# Patient Record
Sex: FEMALE | Race: BLACK OR AFRICAN AMERICAN | URBAN - METROPOLITAN AREA
[De-identification: names, ages, dates, MRNs, and addresses within clinical notes are randomized per-mention and may not be internally consistent; named-entity substitution may affect disease eponyms.]

---

## 2017-04-07 ENCOUNTER — OFFICE VISIT (OUTPATIENT)
Dept: FAMILY MEDICINE CLINIC | Age: 27
End: 2017-04-07

## 2017-04-07 VITALS
WEIGHT: 141.4 LBS | HEART RATE: 92 BPM | TEMPERATURE: 98.1 F | HEIGHT: 68 IN | BODY MASS INDEX: 21.43 KG/M2 | RESPIRATION RATE: 16 BRPM | DIASTOLIC BLOOD PRESSURE: 89 MMHG | OXYGEN SATURATION: 98 % | SYSTOLIC BLOOD PRESSURE: 134 MMHG

## 2017-04-07 DIAGNOSIS — Z56.6 STRESS AT WORK: Primary | ICD-10-CM

## 2017-04-07 DIAGNOSIS — F41.8 DEPRESSION WITH ANXIETY: ICD-10-CM

## 2017-04-07 RX ORDER — ESCITALOPRAM OXALATE 10 MG/1
5 TABLET ORAL DAILY
Qty: 30 TAB | Refills: 3 | Status: SHIPPED | OUTPATIENT
Start: 2017-04-07 | End: 2017-05-21

## 2017-04-07 SDOH — HEALTH STABILITY - MENTAL HEALTH: OTHER PHYSICAL AND MENTAL STRAIN RELATED TO WORK: Z56.6

## 2017-04-07 NOTE — MR AVS SNAPSHOT
Visit Information Date & Time Provider Department Dept. Phone Encounter #  
 4/7/2017  8:00 AM Amado Kincaid MD [facility] [phone] [id number] Follow-up Instructions Return in about 6 weeks (around 5/19/2017). Upcoming Health Maintenance Date Due  
 PAP AKA CERVICAL CYTOLOGY 8/10/2018 DTaP/Tdap/Td series (2 - Td) 4/7/2027 Allergies as of 4/7/2017  Review Complete On: 4/7/2017 By: Amado Kincaid MD  
  
 Severity Noted Reaction Type Reactions Seafood  04/07/2017    Diarrhea Vomiting, chest discomfort, triggers asthma Current Immunizations  Reviewed on 4/7/2017 Name Date  
 TB Skin Test (PPD) Intradermal 3/29/2013 Reviewed by Amado Kincaid MD on 4/7/2017 at  8:36 AM  
 Reviewed by Amado Kincaid MD on 4/7/2017 at  8:39 AM  
You Were Diagnosed With   
  
 Codes Comments Stress at work    -  Primary ICD-10-CM: Z56.6 ICD-9-CM: V62.1 Depression with anxiety     ICD-10-CM: F41.8 ICD-9-CM: 300.4 Vitals BP Pulse Temp Resp Height(growth percentile) Weight(growth percentile) 134/89 (BP 1 Location: Left arm, BP Patient Position: Sitting) 92 98.1 °F (36.7 °C) (Oral) 16 5' 8\" (1.727 m) 141 lb 6.4 oz (64.1 kg) LMP SpO2 BMI OB Status Smoking Status 03/20/2017 98% 21.5 kg/m2 Having regular periods Never Smoker Vitals History BMI and BSA Data Body Mass Index Body Surface Area  
 21.5 kg/m 2 1.75 m 2 Preferred Pharmacy Pharmacy Name Phone [facility] [phone] [facility] [phone] Your Updated Medication List  
  
   
This list is accurate as of: 4/7/17  8:52 AM.  Always use your most recent med list.  
  
  
  
  
 albuterol 90 mcg/actuation inhaler Commonly known as:  PROVENTIL HFA, VENTOLIN HFA, PROAIR HFA Take 1 Puff by inhalation every four (4) hours as needed for Wheezing. escitalopram oxalate 10 mg tablet Commonly known as:  Aleatha Bernheim Take 0.5 Tabs by mouth daily. Dimple Calvin 150-35 mcg/24 hr  
Generic drug:  norelgestromin-ethinyl estradiol 1 Patch by TransDERmal route every Sunday. Prescriptions Sent to Pharmacy Refills  
 escitalopram oxalate (LEXAPRO) 10 mg tablet 3 Sig: Take 0.5 Tabs by mouth daily. Class: Normal  
 Pharmacy: [id number] [facility] [place] [address] [address] [place] [address] #: [phone] Route: Oral  
  
We Performed the Following REFERRAL TO PSYCHOLOGY [DEG91 Custom] Comments:  
 [place] Follow-up Instructions Return in about 6 weeks (around 5/19/2017). Referral Information Referral ID Referred By Referred To  
  
 [id number] Julienne KUMAR Not Available Visits Status Start Date End Date 1 New Request 4/7/17 4/7/18 If your referral has a status of pending review or denied, additional information will be sent to support the outcome of this decision. Introducing [facility]! Jade Aldrich introduces [employer] patient portal. Now you can access parts of your medical record, email your doctor's office, and request medication refills online. 1. In your internet browser, go to [email or web address] 2. Click on the First Time User? Click Here link in the Sign In box. You will see the New Member Sign Up page. 3. Enter your [employer] Access Code exactly as it appears below. You will not need to use this code after youve completed the sign-up process. If you do not sign up before the expiration date, you must request a new code. · [employer] Access Code: [email or web address] Expires: 7/6/2017  8:22 AM 
 
4. Enter the last four digits of your Social Security Number (xxxx) and Date of Birth (mm/dd/yyyy) as indicated and click Submit. You will be taken to the next sign-up page. 5. Create a [employer] ID.  This will be your [employer] login ID and cannot be changed, so think of one that is secure and easy to remember. 6. Create a ZEFR password. You can change your password at any time. 7. Enter your Password Reset Question and Answer. This can be used at a later time if you forget your password. 8. Enter your e-mail address. You will receive e-mail notification when new information is available in [address] 9. Click Sign Up. You can now view and download portions of your medical record. 10. Click the Download Summary menu link to download a portable copy of your medical information. If you have questions, please visit the Frequently Asked Questions section of the [employer] website. Remember, [employer] is NOT to be used for urgent needs. For medical emergencies, dial 911. Now available from your iPhone and Android! Please provide this summary of care documentation to your next provider. Your primary care clinician is listed as Viridiana Duggan. If you have any questions after today's visit, please call [phone].

## 2017-04-07 NOTE — LETTER
NOTIFICATION RETURN TO WORK / SCHOOL 
 
4/7/2017 8:51 AM 
 
Ms. Denise Davidson [address] [facility] [address] To Whom It May Concern: 
 
Denise Davidson is currently under the care of [facility]. She will return to work/school on: May 1, 2017 If there are questions or concerns please have the patient contact our office. Sincerely, Tish Phillips MD

## 2017-04-07 NOTE — PROGRESS NOTES
HISTORY OF PRESENT ILLNESS  Dangelo Snow is a 32 y.o. female. HPI   Pt states she has stress related to work and has been having a \" nervous stomach\" and feeling nauseous. Feels jittery and has no appetite. Pt reports this has been going on for about 1 month. Increase work load which has stressed her out. Increase paper work going to court and unrealistic expectations from her job. Looking for a new job. Feels depressed and has a range of emotions. Has been crying spell d/t not wanting to be there. Has not been sleeping well. Has worked at this job for 3 years. Patient Active Problem List   Diagnosis Code    Asthma J45.909       Current Outpatient Prescriptions   Medication Sig Dispense Refill    norelgestromin-ethinyl estradiol Dorise Orange) 150-35 mcg/24 hr 1 Patch by TransDERmal route every Sunday.  albuterol (PROVENTIL HFA, VENTOLIN HFA, PROAIR HFA) 90 mcg/actuation inhaler Take 1 Puff by inhalation every four (4) hours as needed for Wheezing. 1 Inhaler 11       Allergies   Allergen Reactions    Seafood Diarrhea     Vomiting, chest discomfort, triggers asthma       Past Medical History:   Diagnosis Date    Asthma        Past Surgical History:   Procedure Laterality Date    HX WISDOM TEETH EXTRACTION      INCISION OF TONGUE FOLD      age 11       Family History   Problem Relation Age of Onset    Diabetes Maternal Aunt     Diabetes Maternal Uncle     Cancer Paternal Grandmother      lung    No Known Problems Mother     No Known Problems Father        Social History   Substance Use Topics    Smoking status: Never Smoker    Smokeless tobacco: Never Used    Alcohol use 0.0 oz/week     0 Standard drinks or equivalent per week      Comment: socially        Review of Systems   Constitutional: Negative for malaise/fatigue. HENT: Negative for congestion. Eyes: Negative for blurred vision. Respiratory: Negative for cough and shortness of breath.     Cardiovascular: Negative for chest pain, palpitations and leg swelling. Gastrointestinal: Negative for abdominal pain, constipation and heartburn. Genitourinary: Negative for dysuria, frequency and urgency. Musculoskeletal: Negative for back pain and joint pain. Neurological: Negative for dizziness, tingling and headaches. Endo/Heme/Allergies: Negative for environmental allergies. Psychiatric/Behavioral: Positive for depression. The patient is nervous/anxious and has insomnia. Physical Exam   Constitutional: She appears well-developed and well-nourished. /89 (BP 1 Location: Left arm, BP Patient Position: Sitting)  Pulse 92  Temp 98.1 °F (36.7 °C) (Oral)   Resp 16  Ht 5' 8\" (1.727 m)  Wt 141 lb 6.4 oz (64.1 kg)  LMP 03/20/2017  SpO2 98%  BMI 21.5 kg/m2     HENT:   Right Ear: Tympanic membrane and ear canal normal.   Left Ear: Tympanic membrane and ear canal normal.   Nose: No mucosal edema or rhinorrhea. Mouth/Throat: Oropharynx is clear and moist and mucous membranes are normal.   Neck: Normal range of motion. Neck supple. No thyromegaly present. Cardiovascular: Normal rate and regular rhythm. No murmur heard. Pulmonary/Chest: Effort normal and breath sounds normal.   Abdominal: Soft. Bowel sounds are normal. There is no tenderness. Musculoskeletal: Normal range of motion. She exhibits no edema. Lymphadenopathy:     She has no cervical adenopathy. Skin: Skin is warm and dry. Psychiatric: Her speech is normal and behavior is normal. Thought content normal. Her mood appears anxious. She exhibits a depressed mood. Nursing note and vitals reviewed. ASSESSMENT and PLAN  Tyesha Arrooy was seen today for stress. Diagnoses and all orders for this visit:    Stress at work  -     REFERRAL TO PSYCHOLOGY    Depression with anxiety  -     REFERRAL TO PSYCHOLOGY    Other orders  -     escitalopram oxalate (LEXAPRO) 10 mg tablet; Take 0.5 Tabs by mouth daily.       Follow-up Disposition:  Return in about 6 weeks (around 5/19/2017). reviewed medications and side effects in detail    I have discussed diagnosis listed in this note with pt and/or family. I have discussed treatment plans and options and the risk/benefit analysis of those options, including safe use of medications and possible medication side effects. Through the use of shared decision making we have agreed to the above plan. The patient has received an after-visit summary and questions were answered concerning future plans and follow up. Advise pt of any urgent changes then to proceed to the ER.

## 2017-04-07 NOTE — PROGRESS NOTES
Chief Complaint   Patient presents with    Stress     work related     Pt states she has stress related to work and has been having a \" nervous stomach,\" feels jittery, and has no appetite. Pt reports this has been going on for about 1 month.

## 2017-05-19 ENCOUNTER — OFFICE VISIT (OUTPATIENT)
Dept: FAMILY MEDICINE CLINIC | Age: 27
End: 2017-05-19

## 2017-05-19 VITALS
OXYGEN SATURATION: 98 % | HEIGHT: 68 IN | TEMPERATURE: 98.5 F | RESPIRATION RATE: 16 BRPM | DIASTOLIC BLOOD PRESSURE: 69 MMHG | SYSTOLIC BLOOD PRESSURE: 113 MMHG | WEIGHT: 137.8 LBS | HEART RATE: 80 BPM | BODY MASS INDEX: 20.88 KG/M2

## 2017-05-19 DIAGNOSIS — Z91.09 ENVIRONMENTAL ALLERGIES: ICD-10-CM

## 2017-05-19 DIAGNOSIS — Z56.6 STRESS AT WORK: Primary | ICD-10-CM

## 2017-05-19 DIAGNOSIS — J45.20 MILD INTERMITTENT ASTHMA WITHOUT COMPLICATION: ICD-10-CM

## 2017-05-19 SDOH — HEALTH STABILITY - MENTAL HEALTH: OTHER PHYSICAL AND MENTAL STRAIN RELATED TO WORK: Z56.6

## 2017-05-19 NOTE — MR AVS SNAPSHOT
09/12/17        Magda January [address]      Dear Guiliana Link,    [address] records indicate that you have outstanding lab work and or testing that was ordered for you and has not yet been completed:      CMP       Lipids      [place] Visit Information Date & Time Provider Department Dept. Phone Encounter #  
 5/19/2017 11:45 AM Deja Cristina MD [facility] [phone] [id number] Follow-up Instructions Return in about 3 months (around 8/29/2017) for physical.  
  
Upcoming Health Maintenance Date Due INFLUENZA AGE 9 TO ADULT 8/1/2017 PAP AKA CERVICAL CYTOLOGY 8/10/2018 DTaP/Tdap/Td series (2 - Td) 4/7/2027 Allergies as of 5/19/2017  Review Complete On: 5/19/2017 By: Tony Cardenas Severity Noted Reaction Type Reactions Seafood  04/07/2017    Diarrhea Vomiting, chest discomfort, triggers asthma Current Immunizations  Reviewed on 5/19/2017 Name Date  
 TB Skin Test (PPD) Intradermal 3/29/2013 Reviewed by Deja Cristina MD on 5/19/2017 at 12:57 PM  
Vitals BP Pulse Temp Resp Height(growth percentile) Weight(growth percentile) 113/69 (BP 1 Location: Right arm, BP Patient Position: Sitting) 80 98.5 °F (36.9 °C) (Oral) 16 5' 8\" (1.727 m) 137 lb 12.8 oz (62.5 kg) LMP SpO2 BMI OB Status Smoking Status 04/28/2017 98% 20.95 kg/m2 Having regular periods Never Smoker Vitals History BMI and BSA Data Body Mass Index Body Surface Area  
 20.95 kg/m 2 1.73 m 2 Preferred Pharmacy Pharmacy Name Phone [facility] [phone] [facility] [phone] Your Updated Medication List  
  
   
This list is accurate as of: 5/19/17 12:58 PM.  Always use your most recent med list.  
  
  
  
  
 albuterol 90 mcg/actuation inhaler Commonly known as:  PROVENTIL HFA, VENTOLIN HFA, PROAIR HFA Take 1 Puff by inhalation every four (4) hours as needed for Wheezing.  
  
 escitalopram oxalate 10 mg tablet Commonly known as:  Oletha Dubs Take 0.5 Tabs by mouth daily. Obi Monterey 150-35 mcg/24 hr  
Generic drug:  norelgestromin-ethinyl estradiol 1 Patch by TransDERmal route every Sunday. Follow-up Instructions Return in about 3 months (around 8/29/2017) for physical.  
  
  
Introducing [facility]! Francisco Grider introduces [employer] patient portal. Now you can access parts of your medical record, email your doctor's office, and request medication refills online. 1. In your internet browser, go to [email or web address] 2. Click on the First Time User? Click Here link in the Sign In box. You will see the New Member Sign Up page. 3. Enter your [employer] Access Code exactly as it appears below. You will not need to use this code after youve completed the sign-up process. If you do not sign up before the expiration date, you must request a new code. · [employer] Access Code: [email or web address] Expires: 7/6/2017  8:22 AM 
 
4. Enter the last four digits of your Social Security Number (xxxx) and Date of Birth (mm/dd/yyyy) as indicated and click Submit. You will be taken to the next sign-up page. 5. Create a [employer] ID. This will be your [employer] login ID and cannot be changed, so think of one that is secure and easy to remember. 6. Create a [employer] password. You can change your password at any time. 7. Enter your Password Reset Question and Answer. This can be used at a later time if you forget your password. 8. Enter your e-mail address. You will receive e-mail notification when new information is available in [address] 9. Click Sign Up. You can now view and download portions of your medical record. 10. Click the Download Summary menu link to download a portable copy of your medical information. If you have questions, please visit the Frequently Asked Questions section of the [employer] website. Remember, [employer] is NOT to be used for urgent needs. For medical emergencies, dial 911. Now available from your iPhone and Android! Please provide this summary of care documentation to your next provider. Your primary care clinician is listed as Catalino Orr. If you have any questions after today's visit, please call [phone].

## 2017-05-21 RX ORDER — ALBUTEROL SULFATE 90 UG/1
1 AEROSOL, METERED RESPIRATORY (INHALATION)
Qty: 1 INHALER | Refills: 11 | Status: SHIPPED | OUTPATIENT
Start: 2017-05-21 | End: 2018-05-14 | Stop reason: SDUPTHER

## 2017-05-21 NOTE — PROGRESS NOTES
HISTORY OF PRESENT ILLNESS  Re Abrams is a 32 y.o. female. HPI   Pt states she has stress related to work and has been having a \" nervous stomach\" and feeling nauseous. Feels jittery and has no appetite. Pt reports this has been going on for about 1 month. Increase work load which has stressed her out. Increase paper work going to court and unrealistic expectations from her job. Looking for a new job. Feels depressed and has a range of emotions. Has been crying spell d/t not wanting to be there. Has not been sleeping well. Has worked at this job for 3 years. Patient Active Problem List   Diagnosis Code    Asthma J45.909       Current Outpatient Prescriptions   Medication Sig Dispense Refill    albuterol (PROVENTIL HFA, VENTOLIN HFA, PROAIR HFA) 90 mcg/actuation inhaler Take 1 Puff by inhalation every four (4) hours as needed for Wheezing. 1 Inhaler 11    norelgestromin-ethinyl estradiol (XULANE) 150-35 mcg/24 hr 1 Patch by TransDERmal route every Sunday. Allergies   Allergen Reactions    Seafood Diarrhea     Vomiting, chest discomfort, triggers asthma         Past Medical History:   Diagnosis Date    Asthma          Past Surgical History:   Procedure Laterality Date    HX WISDOM TEETH EXTRACTION      INCISION OF TONGUE FOLD      age 11         Family History   Problem Relation Age of Onset    Diabetes Maternal Aunt     Diabetes Maternal Uncle     Cancer Paternal Grandmother      lung    No Known Problems Mother     No Known Problems Father        Social History   Substance Use Topics    Smoking status: Never Smoker    Smokeless tobacco: Never Used    Alcohol use 0.0 oz/week     0 Standard drinks or equivalent per week      Comment: socially        Review of Systems   Constitutional: Negative for malaise/fatigue. HENT: Negative for congestion. Eyes: Negative for blurred vision. Respiratory: Negative for cough and shortness of breath.     Cardiovascular: Negative for chest pain, palpitations and leg swelling. Gastrointestinal: Negative for abdominal pain, constipation and heartburn. Genitourinary: Negative for dysuria, frequency and urgency. Musculoskeletal: Negative for back pain and joint pain. Neurological: Negative for dizziness, tingling and headaches. Endo/Heme/Allergies: Negative for environmental allergies. Psychiatric/Behavioral: Positive for depression. The patient is nervous/anxious and has insomnia. Physical Exam   Constitutional: She appears well-developed and well-nourished. /89 (BP 1 Location: Left arm, BP Patient Position: Sitting)  Pulse 92  Temp 98.1 °F (36.7 °C) (Oral)   Resp 16  Ht 5' 8\" (1.727 m)  Wt 141 lb 6.4 oz (64.1 kg)  LMP 03/20/2017  SpO2 98%  BMI 21.5 kg/m2     HENT:   Right Ear: Tympanic membrane and ear canal normal.   Left Ear: Tympanic membrane and ear canal normal.   Nose: No mucosal edema or rhinorrhea. Mouth/Throat: Oropharynx is clear and moist and mucous membranes are normal.   Neck: Normal range of motion. Neck supple. No thyromegaly present. Cardiovascular: Normal rate and regular rhythm. No murmur heard. Pulmonary/Chest: Effort normal and breath sounds normal.   Abdominal: Soft. Bowel sounds are normal. There is no tenderness. Musculoskeletal: Normal range of motion. She exhibits no edema. Lymphadenopathy:     She has no cervical adenopathy. Skin: Skin is warm and dry. Psychiatric: Her speech is normal and behavior is normal. Thought content normal. Her mood appears anxious. She exhibits a depressed mood. Nursing note and vitals reviewed. ASSESSMENT and PLAN  There are no diagnoses linked to this encounter. Follow-up Disposition:  Return in about 3 months (around 8/29/2017) for physical.  reviewed medications and side effects in detail    I have discussed diagnosis listed in this note with pt and/or family.  I have discussed treatment plans and options and the risk/benefit analysis of those options, including safe use of medications and possible medication side effects. Through the use of shared decision making we have agreed to the above plan. The patient has received an after-visit summary and questions were answered concerning future plans and follow up. Advise pt of any urgent changes then to proceed to the ER.

## 2017-05-21 NOTE — PROGRESS NOTES
HISTORY OF PRESENT ILLNESS  Brigido Burrows is a 32 y.o. female. HPI     Follow up on stress at work. Has a new job starting on June 4th and stress from old job has resolved. Sleeping back to her normal.  Feeling happy. Completed counseling. Stopped lexapro as she feels it was no longer needed. Asthma Review:  The patient is being seen for follow up of asthma and allergies, not currently in exacerbation. Asthma symptoms occur: less than 2x month and with respiratory infections. Wheezing when present is described as mild and easily relieved with rescue bronchodilator. Current limitations in activity from asthma: none. Frequency of use of quick-relief meds: prn. Regimen compliance: The patient reports adherence to asthma action plan and regimen. Patient Active Problem List   Diagnosis Code    Asthma J45.909       Current Outpatient Prescriptions   Medication Sig Dispense Refill    albuterol (PROVENTIL HFA, VENTOLIN HFA, PROAIR HFA) 90 mcg/actuation inhaler Take 1 Puff by inhalation every four (4) hours as needed for Wheezing. 1 Inhaler 11    norelgestromin-ethinyl estradiol (XULANE) 150-35 mcg/24 hr 1 Patch by TransDERmal route every Sunday.          Allergies   Allergen Reactions    Seafood Diarrhea     Vomiting, chest discomfort, triggers asthma       Past Medical History:   Diagnosis Date    Asthma        Past Surgical History:   Procedure Laterality Date    HX WISDOM TEETH EXTRACTION      INCISION OF TONGUE FOLD      age 11       Family History   Problem Relation Age of Onset    Diabetes Maternal Aunt     Diabetes Maternal Uncle     Cancer Paternal Grandmother      lung    No Known Problems Mother     No Known Problems Father        Social History   Substance Use Topics    Smoking status: Never Smoker    Smokeless tobacco: Never Used    Alcohol use 0.0 oz/week     0 Standard drinks or equivalent per week      Comment: socially       Review of Systems   Constitutional: Negative for malaise/fatigue. HENT: Negative for congestion. Eyes: Negative for blurred vision. Respiratory: Negative for cough and shortness of breath. Cardiovascular: Negative for chest pain, palpitations and leg swelling. Gastrointestinal: Negative for abdominal pain, constipation and heartburn. Genitourinary: Negative for dysuria, frequency and urgency. Musculoskeletal: Negative for back pain and joint pain. Neurological: Negative for dizziness, tingling and headaches. Endo/Heme/Allergies: Positive for environmental allergies. Psychiatric/Behavioral: Negative for depression. The patient does not have insomnia. Physical Exam   Constitutional: She appears well-developed and well-nourished. /69 (BP 1 Location: Right arm, BP Patient Position: Sitting)  Pulse 80  Temp 98.5 °F (36.9 °C) (Oral)   Resp 16  Ht 5' 8\" (1.727 m)  Wt 137 lb 12.8 oz (62.5 kg)  LMP 04/28/2017  SpO2 98%  BMI 20.95 kg/m2     HENT:   Right Ear: Tympanic membrane and ear canal normal.   Left Ear: Tympanic membrane and ear canal normal.   Nose: No mucosal edema or rhinorrhea. Mouth/Throat: Oropharynx is clear and moist and mucous membranes are normal.   Neck: Normal range of motion. Neck supple. No thyromegaly present. Cardiovascular: Normal rate and regular rhythm. No murmur heard. Pulmonary/Chest: Effort normal and breath sounds normal.   Abdominal: Soft. Bowel sounds are normal. There is no tenderness. Musculoskeletal: Normal range of motion. She exhibits no edema. Lymphadenopathy:     She has no cervical adenopathy. Skin: Skin is warm and dry. Psychiatric: She has a normal mood and affect. Nursing note and vitals reviewed. ASSESSMENT and PLAN  Winnie Andrews was seen today for stress.     Diagnoses and all orders for this visit:    Stress at work  Improved     Environmental allergies//  Mild intermittent asthma without complication  -     Refill albuterol (PROVENTIL HFA, VENTOLIN HFA, PROAIR HFA) 90 mcg/actuation inhaler; Take 1 Puff by inhalation every four (4) hours as needed for Wheezing.       Follow-up Disposition:  Return in about 3 months (around 8/29/2017) for physical.

## 2018-05-14 ENCOUNTER — OFFICE VISIT (OUTPATIENT)
Dept: FAMILY MEDICINE CLINIC | Age: 28
End: 2018-05-14

## 2018-05-14 VITALS
HEIGHT: 68 IN | BODY MASS INDEX: 21.22 KG/M2 | OXYGEN SATURATION: 100 % | HEART RATE: 90 BPM | DIASTOLIC BLOOD PRESSURE: 88 MMHG | RESPIRATION RATE: 18 BRPM | WEIGHT: 140 LBS | SYSTOLIC BLOOD PRESSURE: 133 MMHG | TEMPERATURE: 98.7 F

## 2018-05-14 DIAGNOSIS — R11.0 NAUSEA: Primary | ICD-10-CM

## 2018-05-14 DIAGNOSIS — J45.20 MILD INTERMITTENT ASTHMA WITHOUT COMPLICATION: ICD-10-CM

## 2018-05-14 LAB
BILIRUB UR QL STRIP: NEGATIVE
GLUCOSE UR-MCNC: NEGATIVE MG/DL
HCG URINE, QL. (POC): NEGATIVE
KETONES P FAST UR STRIP-MCNC: NORMAL MG/DL
PH UR STRIP: 6 [PH] (ref 4.6–8)
PROT UR QL STRIP: NEGATIVE
SP GR UR STRIP: 1.02 (ref 1–1.03)
UA UROBILINOGEN AMB POC: NORMAL (ref 0.2–1)
URINALYSIS CLARITY POC: CLEAR
URINALYSIS COLOR POC: YELLOW
URINE BLOOD POC: NEGATIVE
URINE LEUKOCYTES POC: NEGATIVE
URINE NITRITES POC: NEGATIVE
VALID INTERNAL CONTROL?: YES

## 2018-05-14 RX ORDER — ALBUTEROL SULFATE 90 UG/1
1 AEROSOL, METERED RESPIRATORY (INHALATION)
Qty: 1 INHALER | Refills: 11 | Status: SHIPPED | OUTPATIENT
Start: 2018-05-14 | End: 2018-08-20 | Stop reason: SDUPTHER

## 2018-05-14 NOTE — PROGRESS NOTES
HISTORY OF PRESENT ILLNESS  Lazaro Ritter is a 29 y.o. female. Nausea    This is a new problem. The current episode started more than 2 days ago. The problem occurs 2 to 4 times per day. The problem has been resolved. There has been no fever. Pertinent negatives include no chills, no fever, no abdominal pain, no diarrhea, no headaches, no myalgias, no cough and no headaches. Nausea started on last week and lasted for about 4 days. Over the weekend her sx have resolved. No particular food triggers noted. LMP 4/13/18 however she did not remove the patch for this week so that she would not have a period yet. Patient Active Problem List   Diagnosis Code    Asthma J45.909       Current Outpatient Prescriptions   Medication Sig Dispense Refill    albuterol (PROVENTIL HFA, VENTOLIN HFA, PROAIR HFA) 90 mcg/actuation inhaler Take 1 Puff by inhalation every four (4) hours as needed for Wheezing. 1 Inhaler 11    norelgestromin-ethinyl estradiol (XULANE) 150-35 mcg/24 hr 1 Patch by TransDERmal route every Sunday. Allergies   Allergen Reactions    Seafood Diarrhea     Vomiting, chest discomfort, triggers asthma       Past Medical History:   Diagnosis Date    Asthma        Past Surgical History:   Procedure Laterality Date    HX WISDOM TEETH EXTRACTION      [place] INCISION OF TONGUE FOLD      age 11       Family History   Problem Relation Age of Onset    Diabetes Maternal Aunt     Diabetes Maternal Uncle     Cancer Paternal Grandmother      lung    No Known Problems Mother     No Known Problems Father        Social History   Substance Use Topics    Smoking status: Never Smoker    Smokeless tobacco: Never Used    Alcohol use 0.0 oz/week     0 Standard drinks or equivalent per week      Comment: socially        Review of Systems   Constitutional: Negative for chills, fever and malaise/fatigue. HENT: Negative for congestion. Eyes: Negative for blurred vision.    Respiratory: Negative for cough and shortness of breath. Cardiovascular: Negative for chest pain, palpitations and leg swelling. Gastrointestinal: Positive for nausea. Negative for abdominal pain, constipation, diarrhea and heartburn. Genitourinary: Negative for dysuria, frequency and urgency. Musculoskeletal: Negative for back pain, joint pain and myalgias. Neurological: Negative for dizziness, tingling and headaches. Endo/Heme/Allergies: Negative for environmental allergies. Psychiatric/Behavioral: Negative for depression. The patient does not have insomnia. Physical Exam   Constitutional: She appears well-developed and well-nourished. /88 (BP 1 Location: Left arm, BP Patient Position: Sitting)  Pulse 90  Temp 98.7 °F (37.1 °C) (Oral)   Resp 18  Ht 5' 8\" (1.727 m)  Wt 140 lb (63.5 kg)  LMP 04/15/2018  SpO2 100%  BMI 21.29 kg/m2   HENT:   Right Ear: Tympanic membrane and ear canal normal.   Left Ear: Tympanic membrane and ear canal normal.   Nose: No mucosal edema or rhinorrhea. Mouth/Throat: Oropharynx is clear and moist and mucous membranes are normal.   Neck: Normal range of motion. Neck supple. No thyromegaly present. Cardiovascular: Normal rate and regular rhythm. No murmur heard. Pulmonary/Chest: Effort normal and breath sounds normal.   Abdominal: Soft. Bowel sounds are normal. There is no tenderness. Musculoskeletal: Normal range of motion. She exhibits no edema. Lymphadenopathy:     She has no cervical adenopathy. Skin: Skin is warm and dry. Psychiatric: She has a normal mood and affect. Nursing note and vitals reviewed. ASSESSMENT and PLAN  Diagnoses and all orders for this visit:    1. Nausea  -     AMB POC URINE PREGNANCY TEST, VISUAL COLOR COMPARISON  -  Negative. -     AMB POC URINALYSIS DIP STICK AUTO W/ MICRO  Light diet over the next few days.       2. Mild intermittent asthma without complication  -     Refill albuterol (PROVENTIL HFA, VENTOLIN HFA, PROAIR HFA) 90 mcg/actuation inhaler; Take 1 Puff by inhalation every four (4) hours as needed for Wheezing.       Follow-up Disposition: follow up as needed

## 2018-05-14 NOTE — MR AVS SNAPSHOT
[facility] 
 
 
 [address] [facility] [place] [address] [address] 
[phone] Patient: Aisha Kellogg MRN: [id number] FWA:[birth year] Visit Information Date & Time Provider Department Dept. Phone Encounter #  
 5/14/2018  3:15 PM Tamika Sampson MD [facility] [phone] [id number] Your Appointments 8/20/2018  2:15 PM  
Complete Physical with Tamika Sampson MD  
[facility] [address] [facility]) Appt Note: CPE 28YR $20CP, CC, $0PB EYT 05/12/2018  
 [address] [facility] [id number] [phone] [address] [place] [address] Upcoming Health Maintenance Date Due  
 PAP AKA CERVICAL CYTOLOGY 8/10/2018 Influenza Age 5 to Adult 8/1/2018 DTaP/Tdap/Td series (2 - Td) 4/7/2027 Allergies as of 5/14/2018  Review Complete On: 5/14/2018 By: Ginette Scales LPN Severity Noted Reaction Type Reactions Seafood  04/07/2017    Diarrhea Vomiting, chest discomfort, triggers asthma Current Immunizations  Reviewed on 5/14/2018 Name Date  
 TB Skin Test (PPD) Intradermal 3/29/2013 Reviewed by Tamika Sampson MD on 5/14/2018 at  4:03 PM  
You Were Diagnosed With   
  
 Codes Comments Nausea    -  Primary ICD-10-CM: R11.0 ICD-9-CM: 787.02   
 Mild intermittent asthma without complication     YDY-72-GE: J45.20 ICD-9-CM: 493.90 Vitals BP Pulse Temp Resp Height(growth percentile) Weight(growth percentile) 133/88 (BP 1 Location: Left arm, BP Patient Position: Sitting) 90 98.7 °F (37.1 °C) (Oral) 18 5' 8\" (1.727 m) 140 lb (63.5 kg) LMP SpO2 BMI OB Status Smoking Status 04/15/2018 100% 21.29 kg/m2 Having regular periods Never Smoker BMI and BSA Data Body Mass Index Body Surface Area  
 21.29 kg/m 2 1.75 m 2 [facility]erred [facility] [address] [place] [address], [address] [place] [address] Rd. [phone] Your Updated Medication List  
  
   
This list is accurate as of 5/14/18  4:09 PM.  Always use your most recent med list.  
  
  
  
  
 albuterol 90 mcg/actuation inhaler Commonly known as:  PROVENTIL HFA, VENTOLIN HFA, PROAIR HFA Take 1 Puff by inhalation every four (4) hours as needed for Wheezing. Jerelene Lowe 150-35 mcg/24 hr  
Generic drug:  norelgestromin-ethinyl estradiol 1 Patch by TransDERmal route every Sunday. Prescriptions Sent to Pharmacy Refills  
 albuterol (PROVENTIL HFA, VENTOLIN HFA, PROAIR HFA) 90 mcg/actuation inhaler 11 Sig: Take 1 Puff by inhalation every four (4) hours as needed for Wheezing. Class: Normal  
 Pharmacy: [facility] [address] [facility] #: [phone] Route: Inhalation We Performed the Following AMB POC URINALYSIS DIP STICK AUTO W/ MICRO [07297 CPT(R)] AMB POC URINE PREGNANCY TEST, VISUAL COLOR COMPARISON [83816 CPT(R)] Patient Instructions Nausea and Vomiting: Care Instructions Your Care Instructions When you are nauseated, you may feel weak and sweaty and notice a lot of saliva in your mouth. Nausea often leads to vomiting. Most of the time you do not need to worry about nausea and vomiting, but they can be signs of other illnesses. Two common causes of nausea and vomiting are stomach flu and food poisoning. Nausea and vomiting from viral stomach flu will usually start to improve within 24 hours. Nausea and vomiting from food poisoning may last from 12 to 48 hours. The doctor has checked you carefully, but problems can develop later. If you notice any problems or new symptoms, get medical treatment right away. Follow-up care is a key part of your treatment and safety.  Be sure to make and go to all appointments, and call your doctor if you are having problems. It's also a good idea to know your test results and keep a list of the medicines you take. How can you care for yourself at home? · To prevent dehydration, drink plenty of fluids, enough so that your urine is light yellow or clear like water. Choose water and other caffeine-free clear liquids until you feel better. If you have kidney, heart, or liver disease and have to limit fluids, talk with your doctor before you increase the amount of fluids you drink. · Rest in bed until you feel better. · When you are able to eat, try clear soups, mild foods, and liquids until all symptoms are gone for 12 to 48 hours. Other good choices include dry toast, crackers, cooked cereal, and gelatin dessert, such as Jell-O. When should you call for help? Call 911 anytime you think you may need emergency care. For example, call if: 
? · You passed out (lost consciousness). ?Call your doctor now or seek immediate medical care if: 
? · You have symptoms of dehydration, such as: ¨ Dry eyes and a dry mouth. ¨ Passing only a little dark urine. ¨ Feeling thirstier than usual.  
? · You have new or worsening belly pain. ? · You have a new or higher fever. ? · You vomit blood or what looks like coffee grounds. ? Watch closely for changes in your health, and be sure to contact your doctor if: 
? · You have ongoing nausea and vomiting. ? · Your vomiting is getting worse. ? · Your vomiting lasts longer than 2 days. ? · You are not getting better as expected. Where can you learn more? Go to http://nida-pipo.info/. Enter 25 290348 in the search box to learn more about \"Nausea and Vomiting: Care Instructions. \" Current as of: March 20, 2017 Content Version: 11.4 © 0139-6284 [employer]. Care instructions adapted under license by [employer] (which disclaims liability or warranty for this information).  If you have questions about a medical condition or this instruction, always ask your healthcare professional. [place] [address] any warranty or liability for your use of this information. Introducing [facility]! Rashel Flores introduces [employer] patient portal. Now you can access parts of your medical record, email your doctor's office, and request medication refills online. 1. In your internet browser, go to [email or web address] 2. Click on the First Time User? Click Here link in the Sign In box. You will see the New Member Sign Up page. 3. Enter your [employer] Access Code exactly as it appears below. You will not need to use this code after youve completed the sign-up process. If you do not sign up before the expiration date, you must request a new code. · [employer] Access Code: [email or web address] Expires: 8/12/2018  3:34 PM 
 
4. Enter the last four digits of your Social Security Number (xxxx) and Date of Birth (mm/dd/yyyy) as indicated and click Submit. You will be taken to the next sign-up page. 5. Create a [employer] ID. This will be your [employer] login ID and cannot be changed, so think of one that is secure and easy to remember. 6. Create a [employer] password. You can change your password at any time. 7. Enter your Password Reset Question and Answer. This can be used at a later time if you forget your password. 8. Enter your e-mail address. You will receive e-mail notification when new information is available in [address] 9. Click Sign Up. You can now view and download portions of your medical record. 10. Click the Download Summary menu link to download a portable copy of your medical information. If you have questions, please visit the Frequently Asked Questions section of the [employer] website. Remember, [employer] is NOT to be used for urgent needs. For medical emergencies, dial 911. Now available from your iPhone and Android! Please provide this summary of care documentation to your next provider. Your primary care clinician is listed as Caridad Herman. If you have any questions after today's visit, please call [phone].

## 2018-05-14 NOTE — PATIENT INSTRUCTIONS
Nausea and Vomiting: Care Instructions  Your Care Instructions    When you are nauseated, you may feel weak and sweaty and notice a lot of saliva in your mouth. Nausea often leads to vomiting. Most of the time you do not need to worry about nausea and vomiting, but they can be signs of other illnesses. Two common causes of nausea and vomiting are stomach flu and food poisoning. Nausea and vomiting from viral stomach flu will usually start to improve within 24 hours. Nausea and vomiting from food poisoning may last from 12 to 48 hours. The doctor has checked you carefully, but problems can develop later. If you notice any problems or new symptoms, get medical treatment right away. Follow-up care is a key part of your treatment and safety. Be sure to make and go to all appointments, and call your doctor if you are having problems. It's also a good idea to know your test results and keep a list of the medicines you take. How can you care for yourself at home? · To prevent dehydration, drink plenty of fluids, enough so that your urine is light yellow or clear like water. Choose water and other caffeine-free clear liquids until you feel better. If you have kidney, heart, or liver disease and have to limit fluids, talk with your doctor before you increase the amount of fluids you drink. · Rest in bed until you feel better. · When you are able to eat, try clear soups, mild foods, and liquids until all symptoms are gone for 12 to 48 hours. Other good choices include dry toast, crackers, cooked cereal, and gelatin dessert, such as Jell-O. When should you call for help? Call 911 anytime you think you may need emergency care. For example, call if:  ? · You passed out (lost consciousness). ?Call your doctor now or seek immediate medical care if:  ? · You have symptoms of dehydration, such as:  ¨ Dry eyes and a dry mouth. ¨ Passing only a little dark urine.   ¨ Feeling thirstier than usual.   ? · You have new or worsening belly pain. ? · You have a new or higher fever. ? · You vomit blood or what looks like coffee grounds. ? Watch closely for changes in your health, and be sure to contact your doctor if:  ? · You have ongoing nausea and vomiting. ? · Your vomiting is getting worse. ? · Your vomiting lasts longer than 2 days. ? · You are not getting better as expected. Where can you learn more? Go to http://nida-pipo.info/. Enter 25 [id number] in the search box to learn more about \"Nausea and Vomiting: Care Instructions. \"  Current as of: March 20, 2017  Content Version: 11.4  © 0710-8801 [employer]. Care instructions adapted under license by [employer] (which disclaims liability or warranty for this information). If you have questions about a medical condition or this instruction, always ask your healthcare professional. [address] any warranty or liability for your use of this information.

## 2018-08-20 ENCOUNTER — OFFICE VISIT (OUTPATIENT)
Dept: FAMILY MEDICINE CLINIC | Age: 28
End: 2018-08-20

## 2018-08-20 VITALS
HEIGHT: 68 IN | SYSTOLIC BLOOD PRESSURE: 112 MMHG | WEIGHT: 138.5 LBS | TEMPERATURE: 97.4 F | BODY MASS INDEX: 20.99 KG/M2 | HEART RATE: 86 BPM | RESPIRATION RATE: 16 BRPM | OXYGEN SATURATION: 96 % | DIASTOLIC BLOOD PRESSURE: 71 MMHG

## 2018-08-20 DIAGNOSIS — Z00.00 ROUTINE GENERAL MEDICAL EXAMINATION AT A HEALTH CARE FACILITY: Primary | ICD-10-CM

## 2018-08-20 DIAGNOSIS — J45.20 MILD INTERMITTENT ASTHMA WITHOUT COMPLICATION: ICD-10-CM

## 2018-08-20 LAB
BILIRUB UR QL STRIP: NEGATIVE
GLUCOSE UR-MCNC: NEGATIVE MG/DL
KETONES P FAST UR STRIP-MCNC: NEGATIVE MG/DL
PH UR STRIP: 6.5 [PH] (ref 4.6–8)
PROT UR QL STRIP: NEGATIVE
SP GR UR STRIP: 1.02 (ref 1–1.03)
UA UROBILINOGEN AMB POC: NORMAL (ref 0.2–1)
URINALYSIS CLARITY POC: CLEAR
URINALYSIS COLOR POC: YELLOW
URINE BLOOD POC: NORMAL
URINE LEUKOCYTES POC: NEGATIVE
URINE NITRITES POC: NEGATIVE

## 2018-08-20 RX ORDER — ALBUTEROL SULFATE 90 UG/1
1 AEROSOL, METERED RESPIRATORY (INHALATION)
Qty: 1 INHALER | Refills: 11 | Status: SHIPPED | OUTPATIENT
Start: 2018-08-20

## 2018-08-20 NOTE — PATIENT INSTRUCTIONS

## 2018-08-20 NOTE — MR AVS SNAPSHOT
[facility] 
 
 
 [address] [id number] [phone] Patient: Silver Ugarte MRN: [id number] Visit Information Date & Time Provider Department Dept. Phone Encounter #  
 8/20/2018  2:15 PM Abbey Moss [place] [phone] [id number] Upcoming Health Maintenance Date Due Influenza Age 5 to Adult 8/20/2019* PAP AKA CERVICAL CYTOLOGY 2/28/2021 DTaP/Tdap/Td series (2 - Td) 4/7/2027 *Topic was postponed. The date shown is not the original due date. Allergies as of 8/20/2018  Review Complete On: 8/20/2018 By: Abbey Moss MD  
  
 Severity Noted Reaction Type Reactions Seafood  04/07/2017    Diarrhea Vomiting, chest discomfort, triggers asthma Current Immunizations  Reviewed on 8/20/2018 Name Date  
 TB Skin Test (PPD) Intradermal 3/29/2013 Reviewed by Abbey Moss MD on 8/20/2018 at  2:51 PM  
You Were Diagnosed With   
  
 Codes Comments Routine general medical examination at a health care facility    -  Primary ICD-10-CM: Z00.00 ICD-9-CM: V70.0 Mild intermittent asthma without complication     PRR-93-BX: J45.20 ICD-9-CM: 493.90 Vitals BP Pulse Temp Resp Height(growth percentile) Weight(growth percentile) 112/71 (BP 1 Location: Right arm, BP Patient Position: Sitting) 86 97.4 °F (36.3 °C) (Oral) 16 5' 8\" (1.727 m) 138 lb 8 oz (62.8 kg) LMP SpO2 BMI OB Status Smoking Status 08/06/2018 (Approximate) 96% 21.06 kg/m2 Having regular periods Never Smoker BMI and BSA Data Body Mass Index Body Surface Area 21.06 kg/m 2 1.74 m 2 Preferred Pharmacy Pharmacy Name Phone [facility] [address], [address] [place] [address] Rd. [phone] Your Updated Medication List  
  
   
This list is accurate as of 8/20/18  3:13 PM.  Always use your most recent med list.  
  
  
  
 albuterol 90 mcg/actuation inhaler Commonly known as:  PROVENTIL HFA, VENTOLIN HFA, PROAIR HFA Take 1 Puff by inhalation every four (4) hours as needed for Wheezing. Simone Landa 150-35 mcg/24 hr  
Generic drug:  norelgestromin-ethinyl estradiol 1 Patch by TransDERmal route every Sunday. We Performed the Following AMB POC URINALYSIS DIP STICK AUTO W/ MICRO [48718 CPT(R)] CBC W/O DIFF [48270 CPT(R)] LIPID PANEL [38655 CPT(R)] METABOLIC PANEL, COMPREHENSIVE [82523 CPT(R)] Patient Instructions Well Visit, Ages 25 to 48: Care Instructions Your Care Instructions Physical exams can help you stay healthy. Your doctor has checked your overall health and may have suggested ways to take good care of yourself. He or she also may have recommended tests. At home, you can help prevent illness with healthy eating, regular exercise, and other steps. Follow-up care is a key part of your treatment and safety. Be sure to make and go to all appointments, and call your doctor if you are having problems. It's also a good idea to know your test results and keep a list of the medicines you take. How can you care for yourself at home? · Reach and stay at a healthy weight. This will lower your risk for many problems, such as obesity, diabetes, heart disease, and high blood pressure. · Get at least 30 minutes of physical activity on most days of the week. Walking is a good choice. You also may want to do other activities, such as running, swimming, cycling, or playing tennis or team sports. Discuss any changes in your exercise program with your doctor. · Do not smoke or allow others to smoke around you. If you need help quitting, talk to your doctor about stop-smoking programs and medicines. These can increase your chances of quitting for good.  
· Talk to your doctor about whether you have any risk factors for sexually transmitted infections (STIs). Having one sex partner (who does not have STIs and does not have sex with anyone else) is a good way to avoid these infections. · Use birth control if you do not want to have children at this time. Talk with your doctor about the choices available and what might be best for you. · Protect your skin from too much sun. When you're outdoors from 10 a.m. to 4 p.m., stay in the shade or cover up with clothing and a hat with a wide brim. Wear sunglasses that block UV rays. Even when it's cloudy, put broad-spectrum sunscreen (SPF 30 or higher) on any exposed skin. · See a dentist one or two times a year for checkups and to have your teeth cleaned. · Wear a seat belt in the car. · Drink alcohol in moderation, if at all. That means no more than 2 drinks a day for men and 1 drink a day for women. Follow your doctor's advice about when to have certain tests. These tests can spot problems early. For everyone · Cholesterol. Have the fat (cholesterol) in your blood tested after age 21. Your doctor will tell you how often to have this done based on your age, family history, or other things that can increase your risk for heart disease. · Blood pressure. Have your blood pressure checked during a routine doctor visit. Your doctor will tell you how often to check your blood pressure based on your age, your blood pressure results, and other factors. · Vision. Talk with your doctor about how often to have a glaucoma test. 
· Diabetes. Ask your doctor whether you should have tests for diabetes. · Colon cancer. Have a test for colon cancer at age 48. You may have one of several tests. If you are younger than 48, you may need a test earlier if you have any risk factors. Risk factors include whether you already had a precancerous polyp removed from your colon or whether your parent, brother, sister, or child has had colon cancer. For women · Breast exam and mammogram. Talk to your doctor about when you should have a clinical breast exam and a mammogram. Medical experts differ on whether and how often women under 50 should have these tests. Your doctor can help you decide what is right for you. · Pap test and pelvic exam. Begin Pap tests at age 24. A Pap test is the best way to find cervical cancer. The test often is part of a pelvic exam. Ask how often to have this test. 
· Tests for sexually transmitted infections (STIs). Ask whether you should have tests for STIs. You may be at risk if you have sex with more than one person, especially if your partners do not wear condoms. For men · Tests for sexually transmitted infections (STIs). Ask whether you should have tests for STIs. You may be at risk if you have sex with more than one person, especially if you do not wear a condom. · Testicular cancer exam. Ask your doctor whether you should check your testicles regularly. · Prostate exam. Talk to your doctor about whether you should have a blood test (called a PSA test) for prostate cancer. Experts differ on whether and when men should have this test. Some experts suggest it if you are older than 39 and are -American or have a father or brother who got prostate cancer when he was younger than 72. When should you call for help? Watch closely for changes in your health, and be sure to contact your doctor if you have any problems or symptoms that concern you. Where can you learn more? Go to http://nida-pipo.info/. Enter P072 in the search box to learn more about \"Well Visit, Ages 25 to 48: Care Instructions. \" Current as of: May 16, 2017 Content Version: 11.7 © 2958-1692 Healthwise, Incorporated. Care instructions adapted under license by [employer] (which disclaims liability or warranty for this information).  If you have questions about a medical condition or this instruction, always ask your healthcare professional. [address] any warranty or liability for your use of this information. Introducing hospitals & HEALTH SERVICES! [facility] introduces [employer] patient portal. Now you can access parts of your medical record, email your doctor's office, and request medication refills online. 1. In your internet browser, go to [email or web address] 2. Click on the First Time User? Click Here link in the Sign In box. You will see the New Member Sign Up page. 3. Enter your [employer] Access Code exactly as it appears below. You will not need to use this code after youve completed the sign-up process. If you do not sign up before the expiration date, you must request a new code. · [employer] Access Code: [email or web address] Expires: 11/18/2018  2:23 PM 
 
4. Enter the last four digits of your Social Security Number (xxxx) and Date of Birth (mm/dd/yyyy) as indicated and click Submit. You will be taken to the next sign-up page. 5. Create a [employer] ID. This will be your [employer] login ID and cannot be changed, so think of one that is secure and easy to remember. 6. Create a [employer] password. You can change your password at any time. 7. Enter your Password Reset Question and Answer. This can be used at a later time if you forget your password. 8. Enter your e-mail address. You will receive e-mail notification when new information is available in [address]. 9. Click Sign Up. You can now view and download portions of your medical record. 10. Click the Download Summary menu link to download a portable copy of your medical information. If you have questions, please visit the Frequently Asked Questions section of the [employer] website. Remember, [employer] is NOT to be used for urgent needs. For medical emergencies, dial 911. Now available from your iPhone and Android! Please provide this summary of care documentation to your next provider. Your primary care clinician is listed as Tamela Pino. If you have any questions after today's visit, please call [phone].

## 2018-08-20 NOTE — PROGRESS NOTES
Subjective:   29 y.o. female for annual medical exam.   Her gyne and breast care is done elsewhere by her Ob-Gyne physician. Asthma Review:  The patient is being seen for follow up of asthma and allergies, not currently in exacerbation. Asthma symptoms occur: less than 1x month but usually with respiratory infections. Wheezing when present is described as mild and easily relieved with rescue bronchodilator. Current limitations in activity from asthma: none. Frequency of use of quick-relief meds: prn. Patient Active Problem List   Diagnosis Code    Asthma J45.909       Current Outpatient Prescriptions   Medication Sig Dispense Refill    albuterol (PROVENTIL HFA, VENTOLIN HFA, PROAIR HFA) 90 mcg/actuation inhaler Take 1 Puff by inhalation every four (4) hours as needed for Wheezing. 1 Inhaler 11    norelgestromin-ethinyl estradiol (XULANE) 150-35 mcg/24 hr 1 Patch by TransDERmal route every Sunday. Allergies   Allergen Reactions    Seafood Diarrhea     Vomiting, chest discomfort, triggers asthma         Past Medical History:   Diagnosis Date    Asthma          Past Surgical History:   Procedure Laterality Date    HX WISDOM TEETH EXTRACTION      VA INCISION OF TONGUE FOLD      age 11         Family History   Problem Relation Age of Onset    Diabetes Maternal Aunt     Diabetes Maternal Uncle     Cancer Paternal Grandmother      lung    No Known Problems Mother     No Known Problems Father        Social History   Substance Use Topics    Smoking status: Never Smoker    Smokeless tobacco: Never Used    Alcohol use 0.0 oz/week     0 Standard drinks or equivalent per week      Comment: socially       ROS: Feeling generally well. No TIA's or unusual headaches, no dysphagia. No prolonged cough. No dyspnea or chest pain on exertion. No abdominal pain, change in bowel habits, black or bloody stools. No urinary tract symptoms. No new or unusual musculoskeletal symptoms.       Objective: The patient appears well, alert, oriented x 3, in no distress. Visit Vitals    /71 (BP 1 Location: Right arm, BP Patient Position: Sitting)    Pulse 86    Temp 97.4 °F (36.3 °C) (Oral)    Resp 16    Ht 5' 8\" (1.727 m)    Wt 138 lb 8 oz (62.8 kg)    LMP 08/06/2018 (Approximate)    SpO2 96%    BMI 21.06 kg/m2     ENT normal.  Neck supple. No adenopathy or thyromegaly. CATALINO. Lungs are clear, good air entry, no wheezes, rhonchi or rales. S1 and S2 normal, no murmurs, regular rate and rhythm. Abdomen soft without tenderness, guarding, mass or organomegaly. Extremities show no edema, normal peripheral pulses. Neurological is normal, no focal findings. Breast and Pelvic exams are deferred. Assessment/Plan:   Well Woman  increase physical activity, continue present diet with no restrictions, routine labs ordered, call if any problems  Jenn Russell was seen today for complete physical.    ASSESSMENT and PLAN  Diagnoses and all orders for this visit:    1. Routine general medical examination at a health care facility  -     AMB POC URINALYSIS DIP STICK AUTO W/ MICRO  -     CBC W/O DIFF  -     LIPID PANEL  -     METABOLIC PANEL, COMPREHENSIVE    2. Mild intermittent asthma without complication  Stable       Follow-up Disposition:   reviewed diet, exercise and weight control  cardiovascular risk and specific lipid/LDL goals reviewed  reviewed medications and side effects in detail    I have discussed diagnosis listed in this note with pt and/or family. I have discussed treatment plans and options and the risk/benefit analysis of those options, including safe use of medications and possible medication side effects. Through the use of shared decision making we have agreed to the above plan. The patient has received an after-visit summary and questions were answered concerning future plans and follow up. Advise pt of any urgent changes then to proceed to the ER.

## 2018-08-20 NOTE — PROGRESS NOTES
Silver Ugarte is a 29 y.o. female     Chief Complaint   Patient presents with    Complete Physical       Visit Vitals    /71 (BP 1 Location: Right arm, BP Patient Position: Sitting)    Pulse 86    Temp 97.4 °F (36.3 °C) (Oral)    Resp 16    Ht 5' 8\" (1.727 m)    Wt 138 lb 8 oz (62.8 kg)    LMP 08/06/2018 (Approximate)    SpO2 96%    BMI 21.06 kg/m2       Health Maintenance Due   Topic Date Due    Influenza Age 5 to Adult  08/01/2018    PAP AKA CERVICAL CYTOLOGY  08/10/2018       1. Have you been to the ER, urgent care clinic since your last visit? Hospitalized since your last visit? No    2. Have you seen or consulted any other health care providers outside of the [facility] since your last visit? Include any pap smears or colon screening.  No

## 2018-08-21 LAB
ALBUMIN SERPL-MCNC: 4.1 G/DL (ref 3.5–5.5)
ALBUMIN/GLOB SERPL: 1.2 {RATIO} (ref 1.2–2.2)
ALP SERPL-CCNC: 40 IU/L (ref 39–117)
ALT SERPL-CCNC: 15 IU/L (ref 0–32)
AST SERPL-CCNC: 21 IU/L (ref 0–40)
BILIRUB SERPL-MCNC: 0.5 MG/DL (ref 0–1.2)
BUN SERPL-MCNC: 8 MG/DL (ref 6–20)
BUN/CREAT SERPL: 11 (ref 9–23)
CALCIUM SERPL-MCNC: 9.2 MG/DL (ref 8.7–10.2)
CHLORIDE SERPL-SCNC: 100 MMOL/L (ref 96–106)
CHOLEST SERPL-MCNC: 173 MG/DL (ref 100–199)
CO2 SERPL-SCNC: 21 MMOL/L (ref 20–29)
CREAT SERPL-MCNC: 0.75 MG/DL (ref 0.57–1)
ERYTHROCYTE [DISTWIDTH] IN BLOOD BY AUTOMATED COUNT: 12.7 % (ref 12.3–15.4)
GLOBULIN SER CALC-MCNC: 3.5 G/DL (ref 1.5–4.5)
GLUCOSE SERPL-MCNC: 85 MG/DL (ref 65–99)
HCT VFR BLD AUTO: 39.9 % (ref 34–46.6)
HDLC SERPL-MCNC: 68 MG/DL
HGB BLD-MCNC: 13.1 G/DL (ref 11.1–15.9)
INTERPRETATION, 910389: NORMAL
LDLC SERPL CALC-MCNC: 88 MG/DL (ref 0–99)
MCH RBC QN AUTO: 31 PG (ref 26.6–33)
MCHC RBC AUTO-ENTMCNC: 32.8 G/DL (ref 31.5–35.7)
MCV RBC AUTO: 94 FL (ref 79–97)
PLATELET # BLD AUTO: 271 X10E3/UL (ref 150–379)
POTASSIUM SERPL-SCNC: 4.5 MMOL/L (ref 3.5–5.2)
PROT SERPL-MCNC: 7.6 G/DL (ref 6–8.5)
RBC # BLD AUTO: 4.23 X10E6/UL (ref 3.77–5.28)
SODIUM SERPL-SCNC: 138 MMOL/L (ref 134–144)
TRIGL SERPL-MCNC: 83 MG/DL (ref 0–149)
VLDLC SERPL CALC-MCNC: 17 MG/DL (ref 5–40)
WBC # BLD AUTO: 7.2 X10E3/UL (ref 3.4–10.8)

## 2018-10-01 ENCOUNTER — OFFICE VISIT (OUTPATIENT)
Dept: URGENT CARE | Age: 28
End: 2018-10-01

## 2018-10-01 VITALS
TEMPERATURE: 97.8 F | HEART RATE: 76 BPM | RESPIRATION RATE: 16 BRPM | WEIGHT: 138 LBS | OXYGEN SATURATION: 99 % | SYSTOLIC BLOOD PRESSURE: 127 MMHG | HEIGHT: 68 IN | BODY MASS INDEX: 20.92 KG/M2 | DIASTOLIC BLOOD PRESSURE: 70 MMHG

## 2018-10-01 DIAGNOSIS — J06.9 ACUTE URI: ICD-10-CM

## 2018-10-01 DIAGNOSIS — R59.9 PALPABLE LYMPH NODE: Primary | ICD-10-CM

## 2018-10-01 NOTE — MR AVS SNAPSHOT
[address] [place] [address] [phone] Patient: Woody Ayoub MRN: [id number] [facility]:[birth year] Visit Information Date & Time Provider Department Dept. Phone Encounter #  
 10/1/2018  4:30 PM [facility] [phone] [id number] Upcoming Health Maintenance Date Due Influenza Age 5 to Adult 8/20/2019* PAP AKA CERVICAL CYTOLOGY 2/28/2021 DTaP/Tdap/Td series (2 - Td) 4/7/2027 *Topic was postponed. The date shown is not the original due date. Allergies as of 10/1/2018  Review Complete On: 10/1/2018 By: Parviz Saldivar RN Severity Noted Reaction Type Reactions Seafood  04/07/2017    Diarrhea Vomiting, chest discomfort, triggers asthma Current Immunizations  Reviewed on 8/20/2018 Name Date  
 TB Skin Test (PPD) Intradermal 3/29/2013 Not reviewed this visit You Were Diagnosed With   
  
 Codes Comments Palpable lymph node    -  Primary ICD-10-CM: R59.9 ICD-9-CM: 785.6 on left side of neck- mild soreness to touch Acute URI     ICD-10-CM: J06.9 ICD-9-CM: 465.9 Vitals BP Pulse Temp Resp Height(growth percentile) Weight(growth percentile) 127/70 76 97.8 °F (36.6 °C) 16 5' 8\" (1.727 m) 138 lb (62.6 kg) LMP SpO2 BMI OB Status Smoking Status 09/24/2018 99% 20.98 kg/m2 Having regular periods Never Smoker Vitals History BMI and BSA Data Body Mass Index Body Surface Area  
 20.98 kg/m 2 1.73 m 2 Preferred Pharmacy Pharmacy Name Phone [facility] [address], [address] [place] [address] Rd. [phone] Your Updated Medication List  
  
   
This list is accurate as of 10/1/18  4:39 PM.  Always use your most recent med list.  
  
  
  
  
 albuterol 90 mcg/actuation inhaler Commonly known as:  PROVENTIL HFA, VENTOLIN HFA, PROAIR HFA  
 Take 1 Puff by inhalation every four (4) hours as needed for Wheezing. Jamaal Titus 150-35 mcg/24 hr  
Generic drug:  norelgestromin-ethinyl estradiol 1 Patch by TransDERmal route every Sunday. Patient Instructions Swollen Lymph Nodes: Care Instructions Your Care Instructions Lymph nodes are small, bean-shaped glands throughout the body. They help your body fight germs and infections. Lymph nodes often swell when there is a problem such as an injury, infection, or tumor. · The nodes in your neck, under your chin, or behind your ears may swell when you have a cold or sore throat. · An injury or infection in a leg or foot can make the nodes in your groin swell. · Sometimes medicine can make lymph nodes swell, but this is rare. Treatment depends on what caused your nodes to swell. Usually the nodes return to normal size without a problem. Follow-up care is a key part of your treatment and safety. Be sure to make and go to all appointments, and call your doctor if you are having problems. It's also a good idea to know your test results and keep a list of the medicines you take. How can you care for yourself at home? · Take your medicines exactly as prescribed. Call your doctor if you think you are having a problem with your medicine. · Avoid irritation. ¨ Do not squeeze or pick at the lump. ¨ Do not stick a needle in it. · Prevent infection. Do not squeeze, drain, or puncture a painful lump. Doing this can irritate or inflame the lump, push any existing infection deeper into the skin, or cause severe bleeding. · Get extra rest. Slow down just a little from your usual routine. · Drink plenty of fluids, enough so that your urine is light yellow or clear like water. If you have kidney, heart, or liver disease and have to limit fluids, talk with your doctor before you increase the amount of fluids you drink.  
· Take an over-the-counter pain medicine, such as acetaminophen (Tylenol), ibuprofen (Advil, Motrin), or naproxen (Aleve). Read and follow all instructions on the label. · Do not take two or more pain medicines at the same time unless the doctor told you to. Many pain medicines have acetaminophen, which is Tylenol. Too much acetaminophen (Tylenol) can be harmful. When should you call for help? Call your doctor now or seek immediate medical care if: 
  · You have worse symptoms of infection, such as: 
¨ Increased pain, swelling, warmth, or redness. ¨ Red streaks leading from the area. ¨ Pus draining from the area. ¨ A fever.  
 Watch closely for changes in your health, and be sure to contact your doctor if: 
  · Your lymph nodes do not get smaller or do not return to normal.  
  · You do not get better as expected. Where can you learn more? Go to http://nida-pipo.info/. Enter P605 in the search box to learn more about \"Swollen Lymph Nodes: Care Instructions. \" Current as of: November 18, 2017 Content Version: 11.7 © 1562-9531 [employer]. Care instructions adapted under license by [employer] (which disclaims liability or warranty for this information). If you have questions about a medical condition or this instruction, always ask your healthcare professional. [address] any warranty or liability for your use of this information. Introducing [facility] & HEALTH SERVICES! [facility] introduces [facility] patient portal. Now you can access parts of your medical record, email your doctor's office, and request medication refills online. 1. In your internet browser, go to [email or web address] 2. Click on the First Time User? Click Here link in the Sign In box. You will see the New Member Sign Up page. 3. Enter your [facility] Access Code exactly as it appears below. You will not need to use this code after youve completed the sign-up process.  If you do not sign up before the expiration date, you must request a new code. · [employer] Access Code: [email or web address] Expires: 11/18/2018  2:23 PM 
 
4. Enter the last four digits of your Social Security Number (xxxx) and Date of Birth (mm/dd/yyyy) as indicated and click Submit. You will be taken to the next sign-up page. 5. Create a [employer] ID. This will be your [employer] login ID and cannot be changed, so think of one that is secure and easy to remember. 6. Create a [employer] password. You can change your password at any time. 7. Enter your Password Reset Question and Answer. This can be used at a later time if you forget your password. 8. Enter your e-mail address. You will receive e-mail notification when new information is available in [address] 9. Click Sign Up. You can now view and download portions of your medical record. 10. Click the Download Summary menu link to download a portable copy of your medical information. If you have questions, please visit the Frequently Asked Questions section of the [employer] website. Remember, [employer] is NOT to be used for urgent needs. For medical emergencies, dial 911. Now available from your iPhone and Android! Please provide this summary of care documentation to your next provider. Your primary care clinician is listed as Robert Mtz. If you have any questions after today's visit, please call [phone].

## 2018-10-01 NOTE — PATIENT INSTRUCTIONS
Swollen Lymph Nodes: Care Instructions  Your Care Instructions    Lymph nodes are small, bean-shaped glands throughout the body. They help your body fight germs and infections. Lymph nodes often swell when there is a problem such as an injury, infection, or tumor. · The nodes in your neck, under your chin, or behind your ears may swell when you have a cold or sore throat. · An injury or infection in a leg or foot can make the nodes in your groin swell. · Sometimes medicine can make lymph nodes swell, but this is rare. Treatment depends on what caused your nodes to swell. Usually the nodes return to normal size without a problem. Follow-up care is a key part of your treatment and safety. Be sure to make and go to all appointments, and call your doctor if you are having problems. It's also a good idea to know your test results and keep a list of the medicines you take. How can you care for yourself at home? · Take your medicines exactly as prescribed. Call your doctor if you think you are having a problem with your medicine. · Avoid irritation. ¨ Do not squeeze or pick at the lump. ¨ Do not stick a needle in it. · Prevent infection. Do not squeeze, drain, or puncture a painful lump. Doing this can irritate or inflame the lump, push any existing infection deeper into the skin, or cause severe bleeding. · Get extra rest. Slow down just a little from your usual routine. · Drink plenty of fluids, enough so that your urine is light yellow or clear like water. If you have kidney, heart, or liver disease and have to limit fluids, talk with your doctor before you increase the amount of fluids you drink. · Take an over-the-counter pain medicine, such as acetaminophen (Tylenol), ibuprofen (Advil, Motrin), or naproxen (Aleve). Read and follow all instructions on the label. · Do not take two or more pain medicines at the same time unless the doctor told you to.  Many pain medicines have acetaminophen, which is Tylenol. Too much acetaminophen (Tylenol) can be harmful. When should you call for help? Call your doctor now or seek immediate medical care if:    · You have worse symptoms of infection, such as:  ¨ Increased pain, swelling, warmth, or redness. ¨ Red streaks leading from the area. ¨ Pus draining from the area. ¨ A fever.    Watch closely for changes in your health, and be sure to contact your doctor if:    · Your lymph nodes do not get smaller or do not return to normal.     · You do not get better as expected. Where can you learn more? Go to http://nida-pipo.info/. Enter I961 in the search box to learn more about \"Swollen Lymph Nodes: Care Instructions. \"  Current as of: November 18, 2017  Content Version: 11.7  © 3005-7965 [employer]. Care instructions adapted under license by [employer] (which disclaims liability or warranty for this information). If you have questions about a medical condition or this instruction, always ask your healthcare professional. [place] [address] any warranty or liability for your use of this information.

## 2019-05-02 ENCOUNTER — TELEPHONE (OUTPATIENT)
Dept: FAMILY MEDICINE CLINIC | Age: 29
End: 2019-05-02

## 2019-05-02 NOTE — TELEPHONE ENCOUNTER
----- Message from Isaias Akers sent at 5/2/2019  7:25 AM EDT -----  Regarding: Dr. Elpidio Jones/Telephone  Pt just wanted to inform the doctor that she was pregnant. Best contact number is [phone].

## 2019-05-09 ENCOUNTER — TELEPHONE (OUTPATIENT)
Dept: FAMILY MEDICINE CLINIC | Age: 29
End: 2019-05-09

## 2019-05-09 NOTE — TELEPHONE ENCOUNTER
Patient is requesting a RX refill albuterol (PROVENTIL HFA, VENTOLIN HFA, PROAIR HFA) 90 mcg/actuation inhaler she states that the pharmacy will not fill it because she is five weeks pregnant they need approval from her PCP she will have to call the pharmacy, her contact number is [phone]

## 2019-05-09 NOTE — TELEPHONE ENCOUNTER
Spoke with patient , she stated she called her OB/GYN and received her Albuterol Inhaler from pharmacy.

## 2020-07-21 ENCOUNTER — TELEPHONE (OUTPATIENT)
Dept: FAMILY MEDICINE CLINIC | Age: 30
End: 2020-07-21

## 2020-07-27 ENCOUNTER — VIRTUAL VISIT (OUTPATIENT)
Dept: FAMILY MEDICINE CLINIC | Age: 30
End: 2020-07-27

## 2020-07-27 DIAGNOSIS — M77.8 TENDINITIS OF THUMB: Primary | ICD-10-CM

## 2020-07-27 RX ORDER — NAPROXEN SODIUM 220 MG
220 TABLET ORAL 2 TIMES DAILY WITH MEALS
Qty: 30 TAB | Refills: 0
Start: 2020-07-27 | End: 2022-10-19 | Stop reason: ALTCHOICE

## 2020-07-27 NOTE — PROGRESS NOTES
HISTORY OF PRESENT ILLNESS  Dc Thayer is a 27 y.o. female. HPI   Patient encounter by synchronous (real-time) audio-video technology which is patient-initiated. Patient is aware that this encounter is a billable service with coverage as determined by her insurance carrier, all discussed at the time of check-in. Patient has given verbal consent to proceed. C/o right thumb pain over the past several weeks. No injury noted. Hurts with ROM. No swelling. Pain radiates back to the wrist.  Has not taken anything for pain since she is breastfeeding. Pain is 5/10 which comes and goes. Patient Active Problem List   Diagnosis Code    Asthma J45.909       Current Outpatient Medications   Medication Sig Dispense Refill    albuterol (PROVENTIL HFA, VENTOLIN HFA, PROAIR HFA) 90 mcg/actuation inhaler Take 1 Puff by inhalation every four (4) hours as needed for Wheezing. 1 Inhaler 11       Allergies   Allergen Reactions    Seafood Diarrhea     Vomiting, chest discomfort, triggers asthma       Past Medical History:   Diagnosis Date    Asthma        Past Surgical History:   Procedure Laterality Date    HX GYN      HX WISDOM TEETH EXTRACTION      [place] INCISION OF TONGUE FOLD      age 11       Family History   Problem Relation Age of Onset    Diabetes Maternal Aunt     Diabetes Maternal Uncle     Cancer Paternal Grandmother         lung    No Known Problems Mother     No Known Problems Father        Social History     Tobacco Use    Smoking status: Never Smoker    Smokeless tobacco: Never Used   Substance Use Topics    Alcohol use: Yes     Alcohol/week: 0.0 standard drinks     Comment: socially        ROS    Physical Exam  Constitutional:       Appearance: Normal appearance. Pulmonary:      Effort: Pulmonary effort is normal.   Musculoskeletal:      Comments: Points to pain along the extensor tendon of the right thumb. Neurological:      Mental Status: She is alert.    Psychiatric: Mood and Affect: Mood normal.         Thought Content: Thought content normal.         ASSESSMENT and PLAN  Diagnoses and all orders for this visit:    1. Tendinitis of thumb  -     naproxen sodium (Aleve) 220 mg tablet; Take 1 Tab by mouth two (2) times daily (with meals). ROM exercises. Heat and splint. reviewed medications and side effects in detail    I have discussed diagnosis listed in this note with pt and/or family. I have discussed treatment plans and options and the risk/benefit analysis of those options, including safe use of medications and possible medication side effects. Through the use of shared decision making we have agreed to the above plan. Advise pt of any urgent changes then to proceed to the ER. Due to this being a TeleHealth encounter (During [address] emergency), evaluation of the following organ systems was limited: Vital/Constitutional/EENT/Resp/CV/GI//MS/Neuro/Skin/Heme-Lymph-Imm. Pursuant to the emergency declaration under the [address] and [facility] waiver authority and the [employer] Act, this Virtual Visit was conducted, with patient's consent, to reduce the patient's risk of exposure to COVID-19 and provide continuity of care for an established patient. Services were provided through a video synchronous discussion virtually to substitute for in-person appointment. This visit was completed using [email or web address] me    Time in virtual visit: 8 minutes

## 2020-07-27 NOTE — PROGRESS NOTES
Chief Complaint   Patient presents with    Thumb Pain     patient c/o right thumb pain for about 2 weeks   . Patient reports right thumb pain 5/10 at the present time. Patient has not tried anything for pain. 1. Have you been to the ER, urgent care clinic since your last visit? Hospitalized since your last visit? Yes gave birth    2. Have you seen or consulted any other health care providers outside of the [address] [place] [address] since your last visit? Include any pap smears or colon screening.  no

## 2022-10-19 ENCOUNTER — OFFICE VISIT (OUTPATIENT)
Dept: FAMILY MEDICINE CLINIC | Age: 32
End: 2022-10-19

## 2022-10-19 VITALS
HEIGHT: 68 IN | OXYGEN SATURATION: 95 % | BODY MASS INDEX: 22.73 KG/M2 | HEART RATE: 80 BPM | DIASTOLIC BLOOD PRESSURE: 68 MMHG | WEIGHT: 150 LBS | RESPIRATION RATE: 12 BRPM | TEMPERATURE: 98.6 F | SYSTOLIC BLOOD PRESSURE: 103 MMHG

## 2022-10-19 DIAGNOSIS — Z00.00 ROUTINE GENERAL MEDICAL EXAMINATION AT A HEALTH CARE FACILITY: Primary | ICD-10-CM

## 2022-10-19 DIAGNOSIS — Z13.220 SCREENING, LIPID: ICD-10-CM

## 2022-10-19 PROCEDURE — 99395 PREV VISIT EST AGE 18-39: CPT | Performed by: FAMILY MEDICINE

## 2022-10-19 RX ORDER — ALBUTEROL SULFATE 90 UG/1
2 AEROSOL, METERED RESPIRATORY (INHALATION)
COMMUNITY
End: 2022-10-19

## 2022-10-19 NOTE — PROGRESS NOTES
Subjective:   28 y.o. female for annual medical exam.   Her gyne and breast care is done elsewhere by her Ob-Gyne physician. Patient Active Problem List   Diagnosis Code    Asthma J45.909       Current Outpatient Medications   Medication Sig Dispense Refill    naproxen sodium (Aleve) 220 mg tablet Take 1 Tab by mouth two (2) times daily (with meals). 30 Tab 0    albuterol (PROVENTIL HFA, VENTOLIN HFA, PROAIR HFA) 90 mcg/actuation inhaler Take 1 Puff by inhalation every four (4) hours as needed for Wheezing. 1 Inhaler 11     Allergies   Allergen Reactions    Seafood Diarrhea     Vomiting, chest discomfort, triggers asthma         Past Medical History:   Diagnosis Date    Asthma          Past Surgical History:   Procedure Laterality Date    HX GYN      HX WISDOM TEETH EXTRACTION      [place] INCISION OF TONGUE FOLD      age 11           Family History   Problem Relation Age of Onset    Diabetes Maternal Aunt     Diabetes Maternal Uncle     Cancer Paternal Grandmother         lung    No Known Problems Mother     No Known Problems Father        Social History     Tobacco Use    Smoking status: Never    Smokeless tobacco: Never   Substance Use Topics    Alcohol use: Yes     Alcohol/week: 0.0 standard drinks     Comment: socially       ROS: Feeling generally well. No TIA's or unusual headaches, no dysphagia. No prolonged cough. No dyspnea or chest pain on exertion. No abdominal pain, change in bowel habits, black or bloody stools. No urinary tract symptoms. No new or unusual musculoskeletal symptoms except for some occasional left knee pain which comes and goes. Feel still at times. We discussed knee exercises. 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 Pre-pregnancy weight 140. Objective: The patient appears well, alert, oriented x 3, in no distress.     Visit Vitals  /68   Pulse 80   Temp 98.6 °F (37 °C)   Resp 12   Ht 5' 8\" (1.727 m)   Wt 150 lb (68 kg)   LMP  (LMP Unknown)   SpO2 95%   BMI 22.81 kg/m²     ENT normal.  Neck supple. No adenopathy or thyromegaly. CATALINO. Lungs are clear, good air entry, no wheezes, rhonchi or rales. S1 and S2 normal, no murmurs, regular rate and rhythm. Abdomen soft without tenderness, guarding, mass or organomegaly. Extremities show no edema, normal peripheral pulses. Neurological is normal, no focal findings. Breast and Pelvic exams are deferred. Assessment/Plan:   Well Woman  increase physical activity, continue present diet with no restrictions, routine labs ordered, call if any problems  Jodee Art was seen today for complete physical.    ASSESSMENT and PLAN  Diagnoses and all orders for this visit:    1. Routine general medical examination at a health care facility  -     CBC W/O DIFF; Future  -     METABOLIC PANEL, COMPREHENSIVE; Future    2. Screening, lipid  -     LIPID PANEL; Future        reviewed diet, exercise and weight control  cardiovascular risk and specific lipid/LDL goals reviewed  reviewed medications and side effects in detail    I have discussed diagnosis listed in this note with pt and/or family. I have discussed treatment plans and options and the risk/benefit analysis of those options, including safe use of medications and possible medication side effects. Through the use of shared decision making we have agreed to the above plan. The patient has received an after-visit summary and questions were answered concerning future plans and follow up. Advise pt of any urgent changes then to proceed to the ER.

## 2022-10-19 NOTE — PROGRESS NOTES
Patient identified by 2 identifiers. Chief Complaint   Patient presents with    Complete Physical   1. Have you been to the ER, urgent care clinic since your last visit? Hospitalized since your last visit? No    2. Have you seen or consulted any other health care providers outside of the [address] [place] [address] since your last visit? Include any pap smears or colon screening.  No

## 2022-10-21 LAB
ALBUMIN SERPL-MCNC: 4.7 G/DL (ref 3.8–4.8)
ALBUMIN/GLOB SERPL: 1.5 {RATIO} (ref 1.2–2.2)
ALP SERPL-CCNC: 71 IU/L (ref 44–121)
ALT SERPL-CCNC: 17 IU/L (ref 0–32)
AST SERPL-CCNC: 17 IU/L (ref 0–40)
BILIRUB SERPL-MCNC: 0.5 MG/DL (ref 0–1.2)
BUN SERPL-MCNC: 17 MG/DL (ref 6–20)
BUN/CREAT SERPL: 24 (ref 9–23)
CALCIUM SERPL-MCNC: 9.7 MG/DL (ref 8.7–10.2)
CHLORIDE SERPL-SCNC: 102 MMOL/L (ref 96–106)
CHOLEST SERPL-MCNC: 179 MG/DL (ref 100–199)
CO2 SERPL-SCNC: 23 MMOL/L (ref 20–29)
CREAT SERPL-MCNC: 0.7 MG/DL (ref 0.57–1)
EGFR: 118 ML/MIN/1.73
ERYTHROCYTE [DISTWIDTH] IN BLOOD BY AUTOMATED COUNT: 11.8 % (ref 11.7–15.4)
GLOBULIN SER CALC-MCNC: 3.1 G/DL (ref 1.5–4.5)
GLUCOSE SERPL-MCNC: 93 MG/DL (ref 70–99)
HCT VFR BLD AUTO: 38.7 % (ref 34–46.6)
HDLC SERPL-MCNC: 69 MG/DL
HGB BLD-MCNC: 13.2 G/DL (ref 11.1–15.9)
IMP & REVIEW OF LAB RESULTS: NORMAL
LDLC SERPL CALC-MCNC: 94 MG/DL (ref 0–99)
MCH RBC QN AUTO: 32.2 PG (ref 26.6–33)
MCHC RBC AUTO-ENTMCNC: 34.1 G/DL (ref 31.5–35.7)
MCV RBC AUTO: 94 FL (ref 79–97)
PLATELET # BLD AUTO: 289 X10E3/UL (ref 150–450)
POTASSIUM SERPL-SCNC: 4.4 MMOL/L (ref 3.5–5.2)
PROT SERPL-MCNC: 7.8 G/DL (ref 6–8.5)
RBC # BLD AUTO: 4.1 X10E6/UL (ref 3.77–5.28)
SODIUM SERPL-SCNC: 138 MMOL/L (ref 134–144)
TRIGL SERPL-MCNC: 88 MG/DL (ref 0–149)
VLDLC SERPL CALC-MCNC: 16 MG/DL (ref 5–40)
WBC # BLD AUTO: 6.4 X10E3/UL (ref 3.4–10.8)

## 2023-05-22 RX ORDER — ALBUTEROL SULFATE 90 UG/1
1 AEROSOL, METERED RESPIRATORY (INHALATION) EVERY 4 HOURS PRN
COMMUNITY
Start: 2018-08-20

## 2023-11-27 ENCOUNTER — TELEPHONE (OUTPATIENT)
Age: 33
End: 2023-11-27

## 2023-11-27 NOTE — TELEPHONE ENCOUNTER
Frida with [facility] states that she need most recent office notes,EKG and labs urgently she can be reached @ [phone] and fax [phone]

## 2023-11-27 NOTE — TELEPHONE ENCOUNTER
Pt most recent lab results and office notes faxed to [address] [place] [address] Street at [facility] today.   No recent EKG in chart

## 2024-01-08 NOTE — TELEPHONE ENCOUNTER
LMOM for patient to call me back with pharmacy details    Last appointment: 10/19/22  Next appointment: 2/6/24  Previous refill encounter(s): 8/20/18    Requested Prescriptions     Pending Prescriptions Disp Refills    albuterol sulfate HFA (PROVENTIL;VENTOLIN;PROAIR) 108 (90 Base) MCG/ACT inhaler 18 g 0     Sig: Inhale 1 puff into the lungs every 4 hours as needed for Wheezing         For Pharmacy Admin Tracking Only    Program: Medication Refill  CPA in place:    Recommendation Provided To:   Intervention Detail: New Rx: 1, reason: Patient Preference  Intervention Accepted By:   Gap Closed?:    Time Spent (min): 5

## 2024-01-09 RX ORDER — ALBUTEROL SULFATE 90 UG/1
1 AEROSOL, METERED RESPIRATORY (INHALATION) EVERY 4 HOURS PRN
Qty: 6.7 G | Refills: 0 | Status: SHIPPED | OUTPATIENT
Start: 2024-01-09 | End: 2024-01-09 | Stop reason: SDUPTHER

## 2024-01-09 RX ORDER — ALBUTEROL SULFATE 90 UG/1
1 AEROSOL, METERED RESPIRATORY (INHALATION) EVERY 4 HOURS PRN
Qty: 6.7 G | Refills: 0 | Status: SHIPPED | OUTPATIENT
Start: 2024-01-09

## 2024-02-06 ENCOUNTER — OFFICE VISIT (OUTPATIENT)
Age: 34
End: 2024-02-06
Payer: COMMERCIAL

## 2024-02-06 VITALS
SYSTOLIC BLOOD PRESSURE: 111 MMHG | BODY MASS INDEX: 23.13 KG/M2 | TEMPERATURE: 99 F | RESPIRATION RATE: 16 BRPM | HEART RATE: 95 BPM | HEIGHT: 69 IN | WEIGHT: 156.2 LBS | DIASTOLIC BLOOD PRESSURE: 77 MMHG

## 2024-02-06 DIAGNOSIS — Z13.220 LIPID SCREENING: ICD-10-CM

## 2024-02-06 DIAGNOSIS — F41.9 ANXIETY: ICD-10-CM

## 2024-02-06 DIAGNOSIS — Z00.00 ROUTINE GENERAL MEDICAL EXAMINATION AT A HEALTH CARE FACILITY: Primary | ICD-10-CM

## 2024-02-06 PROCEDURE — 99395 PREV VISIT EST AGE 18-39: CPT | Performed by: FAMILY MEDICINE

## 2024-02-06 SDOH — ECONOMIC STABILITY: HOUSING INSECURITY
IN THE LAST 12 MONTHS, WAS THERE A TIME WHEN YOU DID NOT HAVE A STEADY PLACE TO SLEEP OR SLEPT IN A SHELTER (INCLUDING NOW)?: NO

## 2024-02-06 SDOH — ECONOMIC STABILITY: FOOD INSECURITY: WITHIN THE PAST 12 MONTHS, THE FOOD YOU BOUGHT JUST DIDN'T LAST AND YOU DIDN'T HAVE MONEY TO GET MORE.: NEVER TRUE

## 2024-02-06 SDOH — ECONOMIC STABILITY: INCOME INSECURITY: HOW HARD IS IT FOR YOU TO PAY FOR THE VERY BASICS LIKE FOOD, HOUSING, MEDICAL CARE, AND HEATING?: NOT VERY HARD

## 2024-02-06 SDOH — ECONOMIC STABILITY: FOOD INSECURITY: WITHIN THE PAST 12 MONTHS, YOU WORRIED THAT YOUR FOOD WOULD RUN OUT BEFORE YOU GOT MONEY TO BUY MORE.: NEVER TRUE

## 2024-02-06 ASSESSMENT — PATIENT HEALTH QUESTIONNAIRE - PHQ9
SUM OF ALL RESPONSES TO PHQ QUESTIONS 1-9: 0
SUM OF ALL RESPONSES TO PHQ QUESTIONS 1-9: 0
1. LITTLE INTEREST OR PLEASURE IN DOING THINGS: 0
SUM OF ALL RESPONSES TO PHQ QUESTIONS 1-9: 0
2. FEELING DOWN, DEPRESSED OR HOPELESS: 0
SUM OF ALL RESPONSES TO PHQ QUESTIONS 1-9: 0

## 2024-02-06 NOTE — PROGRESS NOTES
Chief Complaint   Patient presents with    Annual Exam       \"Have you been to the ER, urgent care clinic since your last visit?  Hospitalized since your last visit?\"    NO    “Have you seen or consulted any other health care providers outside of [facility] since your last visit?”    YES        “Have you had a pap smear?”    NO, IS SCHEDULED FOR THIS MONTH          Vitals:    24 1434   BP: 111/77   Pulse: 95   Resp: 16   Temp: 99 °F (37.2 °C)       Health Maintenance Due   Topic Date Due    Hepatitis B vaccine (1 of 3 - 3-dose series) Never done    Varicella vaccine (1 of 2 - 2-dose childhood series) Never done    Pneumococcal 0-64 years Vaccine (1 - PCV) Never done    HIV screen  Never done    HPV vaccine (2 - 3-dose series) 2011    Cervical cancer screen  Never done    Flu vaccine (1) 2023    COVID-19 Vaccine (3 - - season) 2023    Depression Screen  10/19/2023        The patient, Bobbi Chen, identity was verified by name and .   
Arm, Position: Sitting, Cuff Size: Large Adult)   Pulse 95   Temp 99 °F (37.2 °C) (Oral)   Resp 16   Ht 1.753 m (5' 9\")   Wt 70.9 kg (156 lb 3.2 oz)   LMP 01/15/2024 (Exact Date)   BMI 23.07 kg/m²     The patient appears well, alert, oriented x 3, in no distress.  ENT normal.  Neck supple. No adenopathy or thyromegaly. SUKHI. Lungs are clear, good air entry, no wheezes, rhonchi or rales. S1 and S2 normal, no murmurs, regular rate and rhythm. Abdomen soft without tenderness, guarding, mass or organomegaly. Extremities show no edema, normal peripheral pulses. Neurological is normal, no focal findings.      Assessment/Plan:   well woman  return annually or prn    ASSESSMENT and PLAN  Bobbi was seen today for annual exam.    Diagnoses and all orders for this visit:    Routine general medical examination at a health care facility  -     Comprehensive Metabolic Panel; Future  -     CBC; Future  -     CBC  -     Comprehensive Metabolic Panel    Lipid screening  -     Lipid Panel; Future  -     Lipid Panel    Anxiety  -     TSH; Future  -     TSH  We discussed stress reduction, time management.        Return in about 1 year (around 2/6/2025).  reviewed diet, exercise and weight control  cardiovascular risk and specific lipid/LDL goals reviewed      I have discussed diagnosis listed in this note with pt and/or family. I have discussed treatment plans and options and the risk/benefit analysis of those options, including safe use of medications and possible medication side effects.   Through the use of shared decision making we have agreed to the above plan. The patient has received an after-visit summary and questions were answered concerning future plans and follow up.  Advise pt of any urgent changes then to proceed to the ER.

## 2024-02-08 LAB
ALBUMIN SERPL-MCNC: 4.4 G/DL (ref 3.9–4.9)
ALBUMIN/GLOB SERPL: 1.5 {RATIO} (ref 1.2–2.2)
ALP SERPL-CCNC: 49 IU/L (ref 44–121)
ALT SERPL-CCNC: 12 IU/L (ref 0–32)
AST SERPL-CCNC: 14 IU/L (ref 0–40)
BILIRUB SERPL-MCNC: 0.6 MG/DL (ref 0–1.2)
BUN SERPL-MCNC: 12 MG/DL (ref 6–20)
BUN/CREAT SERPL: 15 (ref 9–23)
CALCIUM SERPL-MCNC: 9.2 MG/DL (ref 8.7–10.2)
CHLORIDE SERPL-SCNC: 103 MMOL/L (ref 96–106)
CHOLEST SERPL-MCNC: 175 MG/DL (ref 100–199)
CO2 SERPL-SCNC: 23 MMOL/L (ref 20–29)
CREAT SERPL-MCNC: 0.79 MG/DL (ref 0.57–1)
EGFRCR SERPLBLD CKD-EPI 2021: 101 ML/MIN/1.73
ERYTHROCYTE [DISTWIDTH] IN BLOOD BY AUTOMATED COUNT: 12.1 % (ref 11.7–15.4)
GLOBULIN SER CALC-MCNC: 2.9 G/DL (ref 1.5–4.5)
GLUCOSE SERPL-MCNC: 88 MG/DL (ref 70–99)
HCT VFR BLD AUTO: 39.1 % (ref 34–46.6)
HDLC SERPL-MCNC: 61 MG/DL
HGB BLD-MCNC: 13.2 G/DL (ref 11.1–15.9)
IMP & REVIEW OF LAB RESULTS: NORMAL
LDLC SERPL CALC-MCNC: 95 MG/DL (ref 0–99)
MCH RBC QN AUTO: 32 PG (ref 26.6–33)
MCHC RBC AUTO-ENTMCNC: 33.8 G/DL (ref 31.5–35.7)
MCV RBC AUTO: 95 FL (ref 79–97)
PLATELET # BLD AUTO: 262 X10E3/UL (ref 150–450)
POTASSIUM SERPL-SCNC: 4.2 MMOL/L (ref 3.5–5.2)
PROT SERPL-MCNC: 7.3 G/DL (ref 6–8.5)
RBC # BLD AUTO: 4.13 X10E6/UL (ref 3.77–5.28)
SODIUM SERPL-SCNC: 138 MMOL/L (ref 134–144)
TRIGL SERPL-MCNC: 107 MG/DL (ref 0–149)
TSH SERPL DL<=0.005 MIU/L-ACNC: 0.85 UIU/ML (ref 0.45–4.5)
VLDLC SERPL CALC-MCNC: 19 MG/DL (ref 5–40)
WBC # BLD AUTO: 6.6 X10E3/UL (ref 3.4–10.8)

## 2024-08-21 NOTE — TELEPHONE ENCOUNTER
Patient states she has had no injury to thumb but if she bends it forward or backward it is very painful. The last two weeks has been worse. Would like a VV does not want to come in.
Pt wants a VV     She is having pain in her thumb x 1 week  Reports she had a baby 6 mos ago - maybe she has been holding the baby more or using hand differently (?)       Best number to reach her is [phone]
Virtual appointment made for Monday per Dr. Sly Lopez
No

## 2024-11-11 NOTE — PROGRESS NOTES
[facility]  Preoperative Instructions        Surgery Date 11/12/2024  Time of Arrival TBD  Contact# [phone]    On the day of your surgery, please report to Surgical Services Registration Desk and sign in at your designated time.  The Surgery Center is located to the right of the Emergency Room.     2. You must have someone with you to drive you home. You should not drive a car for 24 hours following surgery. Please make arrangements for a friend or family member to stay with you for the first 24 hours after your surgery.    3. Do not have anything to eat or drink (including water, gum, mints, coffee, juice) after midnight ?? 11/11/2024  .?This may not apply to medications prescribed by your physician. ?(Please note below the special instructions with medications to take the morning of your procedure.)    4. We recommend you do not drink any alcoholic beverages for 24 hours before and after your surgery.    5. Contact your surgeon's office for instructions on the following medications: non-steroidal anti-inflammatory drugs (i.e. Advil, Aleve), vitamins, and supplements. (Some surgeon's will want you to stop these medications prior to surgery and others may allow you to take them)  **If you are currently taking Plavix, Coumadin, Aspirin and/or other blood-thinning agents, contact your surgeon for instructions.** Your surgeon will partner with the physician prescribing these medications to determine if it is safe to stop or if you need to continue taking.  Please do not stop taking these medications without instructions from your surgeon    6. Wear comfortable clothes.  Wear glasses instead of contacts.  Do not bring any money or jewelry. Please bring picture ID, insurance card, and any prearranged co-payment or hospital payment.  Do not wear make-up, particularly mascara the morning of your surgery.  Do not wear nail polish, particularly if you are having foot /hand surgery.  Wear your hair

## 2024-11-12 ENCOUNTER — ANESTHESIA EVENT (OUTPATIENT)
Facility: HOSPITAL | Age: 34
End: 2024-11-12
Payer: COMMERCIAL

## 2024-11-12 ENCOUNTER — ANESTHESIA (OUTPATIENT)
Facility: HOSPITAL | Age: 34
End: 2024-11-12
Payer: COMMERCIAL

## 2024-11-12 ENCOUNTER — HOSPITAL ENCOUNTER (OUTPATIENT)
Facility: HOSPITAL | Age: 34
Setting detail: OUTPATIENT SURGERY
Discharge: HOME | End: 2024-11-12
Attending: OBSTETRICS & GYNECOLOGY | Admitting: OBSTETRICS & GYNECOLOGY
Payer: COMMERCIAL

## 2024-11-12 VITALS
RESPIRATION RATE: 14 BRPM | HEART RATE: 65 BPM | HEIGHT: 69 IN | SYSTOLIC BLOOD PRESSURE: 127 MMHG | DIASTOLIC BLOOD PRESSURE: 80 MMHG | WEIGHT: 160.5 LBS | OXYGEN SATURATION: 100 % | BODY MASS INDEX: 23.77 KG/M2 | TEMPERATURE: 97.5 F

## 2024-11-12 LAB
BASOPHILS # BLD: 0.1 K/UL (ref 0–0.1)
BASOPHILS NFR BLD: 1 % (ref 0–1)
BLASTS NFR BLD MANUAL: 0 %
DIFFERENTIAL METHOD BLD: ABNORMAL
EOSINOPHIL # BLD: 0 K/UL (ref 0–0.4)
EOSINOPHIL NFR BLD: 0 % (ref 0–7)
ERYTHROCYTE [DISTWIDTH] IN BLOOD BY AUTOMATED COUNT: 13.1 % (ref 11.5–14.5)
HCT VFR BLD AUTO: 34.1 % (ref 35–47)
HGB BLD-MCNC: 12 G/DL (ref 11.5–16)
IMM GRANULOCYTES # BLD AUTO: 0 K/UL
IMM GRANULOCYTES NFR BLD AUTO: 0 %
LYMPHOCYTES # BLD: 2.1 K/UL (ref 0.8–3.5)
LYMPHOCYTES NFR BLD: 24 % (ref 12–49)
MCH RBC QN AUTO: 32.4 PG (ref 26–34)
MCHC RBC AUTO-ENTMCNC: 35.2 G/DL (ref 30–36.5)
MCV RBC AUTO: 92.2 FL (ref 80–99)
METAMYELOCYTES NFR BLD MANUAL: 0 %
MONOCYTES # BLD: 0.4 K/UL (ref 0–1)
MONOCYTES NFR BLD: 5 % (ref 5–13)
MYELOCYTES NFR BLD MANUAL: 0 %
NEUTS BAND NFR BLD MANUAL: 0 % (ref 0–6)
NEUTS SEG # BLD: 6.2 K/UL (ref 1.8–8)
NEUTS SEG NFR BLD: 70 % (ref 32–75)
NRBC # BLD: 0 K/UL (ref 0–0.01)
NRBC BLD-RTO: 0 PER 100 WBC
OTHER CELLS NFR BLD MANUAL: 0
PLATELET # BLD AUTO: 239 K/UL (ref 150–400)
PMV BLD AUTO: 9.9 FL (ref 8.9–12.9)
PROMYELOCYTES NFR BLD MANUAL: 0 %
RBC # BLD AUTO: 3.7 M/UL (ref 3.8–5.2)
RBC MORPH BLD: ABNORMAL
WBC # BLD AUTO: 8.8 K/UL (ref 3.6–11)

## 2024-11-12 PROCEDURE — 6360000002 HC RX W HCPCS: Performed by: OBSTETRICS & GYNECOLOGY

## 2024-11-12 PROCEDURE — 3600000012 HC SURGERY LEVEL 2 ADDTL 15MIN: Performed by: OBSTETRICS & GYNECOLOGY

## 2024-11-12 PROCEDURE — 7100000010 HC PHASE II RECOVERY - FIRST 15 MIN: Performed by: OBSTETRICS & GYNECOLOGY

## 2024-11-12 PROCEDURE — 2500000003 HC RX 250 WO HCPCS: Performed by: NURSE ANESTHETIST, CERTIFIED REGISTERED

## 2024-11-12 PROCEDURE — 7100000001 HC PACU RECOVERY - ADDTL 15 MIN: Performed by: OBSTETRICS & GYNECOLOGY

## 2024-11-12 PROCEDURE — 2709999900 HC NON-CHARGEABLE SUPPLY: Performed by: OBSTETRICS & GYNECOLOGY

## 2024-11-12 PROCEDURE — 88305 TISSUE EXAM BY PATHOLOGIST: CPT

## 2024-11-12 PROCEDURE — 36415 COLL VENOUS BLD VENIPUNCTURE: CPT

## 2024-11-12 PROCEDURE — 2580000003 HC RX 258: Performed by: STUDENT IN AN ORGANIZED HEALTH CARE EDUCATION/TRAINING PROGRAM

## 2024-11-12 PROCEDURE — 3700000000 HC ANESTHESIA ATTENDED CARE: Performed by: OBSTETRICS & GYNECOLOGY

## 2024-11-12 PROCEDURE — 85027 COMPLETE CBC AUTOMATED: CPT

## 2024-11-12 PROCEDURE — 3600000002 HC SURGERY LEVEL 2 BASE: Performed by: OBSTETRICS & GYNECOLOGY

## 2024-11-12 PROCEDURE — 6370000000 HC RX 637 (ALT 250 FOR IP): Performed by: OBSTETRICS & GYNECOLOGY

## 2024-11-12 PROCEDURE — 2500000003 HC RX 250 WO HCPCS: Performed by: OBSTETRICS & GYNECOLOGY

## 2024-11-12 PROCEDURE — 2580000003 HC RX 258: Performed by: NURSE ANESTHETIST, CERTIFIED REGISTERED

## 2024-11-12 PROCEDURE — 3700000001 HC ADD 15 MINUTES (ANESTHESIA): Performed by: OBSTETRICS & GYNECOLOGY

## 2024-11-12 PROCEDURE — 6360000002 HC RX W HCPCS: Performed by: NURSE ANESTHETIST, CERTIFIED REGISTERED

## 2024-11-12 PROCEDURE — 7100000011 HC PHASE II RECOVERY - ADDTL 15 MIN: Performed by: OBSTETRICS & GYNECOLOGY

## 2024-11-12 PROCEDURE — 2580000003 HC RX 258: Performed by: OBSTETRICS & GYNECOLOGY

## 2024-11-12 PROCEDURE — 85007 BL SMEAR W/DIFF WBC COUNT: CPT

## 2024-11-12 PROCEDURE — 7100000000 HC PACU RECOVERY - FIRST 15 MIN: Performed by: OBSTETRICS & GYNECOLOGY

## 2024-11-12 RX ORDER — SODIUM CHLORIDE 0.9 % (FLUSH) 0.9 %
5-40 SYRINGE (ML) INJECTION PRN
Status: DISCONTINUED | OUTPATIENT
Start: 2024-11-12 | End: 2024-11-12 | Stop reason: HOSPADM

## 2024-11-12 RX ORDER — MISOPROSTOL 100 UG/1
200 TABLET ORAL ONCE
Status: COMPLETED | OUTPATIENT
Start: 2024-11-12 | End: 2024-11-12

## 2024-11-12 RX ORDER — SODIUM CHLORIDE, SODIUM LACTATE, POTASSIUM CHLORIDE, CALCIUM CHLORIDE 600; 310; 30; 20 MG/100ML; MG/100ML; MG/100ML; MG/100ML
INJECTION, SOLUTION INTRAVENOUS ONCE
Status: COMPLETED | OUTPATIENT
Start: 2024-11-12 | End: 2024-11-12

## 2024-11-12 RX ORDER — HYDROMORPHONE HYDROCHLORIDE 1 MG/ML
0.25 INJECTION, SOLUTION INTRAMUSCULAR; INTRAVENOUS; SUBCUTANEOUS EVERY 5 MIN PRN
Status: DISCONTINUED | OUTPATIENT
Start: 2024-11-12 | End: 2024-11-12 | Stop reason: HOSPADM

## 2024-11-12 RX ORDER — PROCHLORPERAZINE EDISYLATE 5 MG/ML
5 INJECTION INTRAMUSCULAR; INTRAVENOUS
Status: DISCONTINUED | OUTPATIENT
Start: 2024-11-12 | End: 2024-11-12 | Stop reason: HOSPADM

## 2024-11-12 RX ORDER — SODIUM CHLORIDE, SODIUM LACTATE, POTASSIUM CHLORIDE, CALCIUM CHLORIDE 600; 310; 30; 20 MG/100ML; MG/100ML; MG/100ML; MG/100ML
INJECTION, SOLUTION INTRAVENOUS
Status: DISCONTINUED | OUTPATIENT
Start: 2024-11-12 | End: 2024-11-12 | Stop reason: SDUPTHER

## 2024-11-12 RX ORDER — NALOXONE HYDROCHLORIDE 0.4 MG/ML
INJECTION, SOLUTION INTRAMUSCULAR; INTRAVENOUS; SUBCUTANEOUS PRN
Status: DISCONTINUED | OUTPATIENT
Start: 2024-11-12 | End: 2024-11-12 | Stop reason: HOSPADM

## 2024-11-12 RX ORDER — LIDOCAINE HYDROCHLORIDE 10 MG/ML
INJECTION, SOLUTION INFILTRATION; PERINEURAL PRN
Status: DISCONTINUED | OUTPATIENT
Start: 2024-11-12 | End: 2024-11-12 | Stop reason: ALTCHOICE

## 2024-11-12 RX ORDER — METHYLERGONOVINE MALEATE 0.2 MG/ML
200 INJECTION INTRAVENOUS PRN
Status: DISCONTINUED | OUTPATIENT
Start: 2024-11-12 | End: 2024-11-12 | Stop reason: HOSPADM

## 2024-11-12 RX ORDER — FENTANYL CITRATE 50 UG/ML
INJECTION, SOLUTION INTRAMUSCULAR; INTRAVENOUS
Status: DISCONTINUED | OUTPATIENT
Start: 2024-11-12 | End: 2024-11-12 | Stop reason: SDUPTHER

## 2024-11-12 RX ORDER — ONDANSETRON 2 MG/ML
INJECTION INTRAMUSCULAR; INTRAVENOUS
Status: DISCONTINUED | OUTPATIENT
Start: 2024-11-12 | End: 2024-11-12 | Stop reason: SDUPTHER

## 2024-11-12 RX ORDER — FENTANYL CITRATE 50 UG/ML
50 INJECTION, SOLUTION INTRAMUSCULAR; INTRAVENOUS EVERY 5 MIN PRN
Status: DISCONTINUED | OUTPATIENT
Start: 2024-11-12 | End: 2024-11-12 | Stop reason: HOSPADM

## 2024-11-12 RX ORDER — SODIUM CHLORIDE 0.9 % (FLUSH) 0.9 %
5-40 SYRINGE (ML) INJECTION EVERY 12 HOURS SCHEDULED
Status: DISCONTINUED | OUTPATIENT
Start: 2024-11-12 | End: 2024-11-12 | Stop reason: HOSPADM

## 2024-11-12 RX ORDER — MISOPROSTOL 100 UG/1
TABLET ORAL PRN
Status: DISCONTINUED | OUTPATIENT
Start: 2024-11-12 | End: 2024-11-12 | Stop reason: ALTCHOICE

## 2024-11-12 RX ORDER — DEXAMETHASONE SODIUM PHOSPHATE 4 MG/ML
INJECTION, SOLUTION INTRA-ARTICULAR; INTRALESIONAL; INTRAMUSCULAR; INTRAVENOUS; SOFT TISSUE
Status: DISCONTINUED | OUTPATIENT
Start: 2024-11-12 | End: 2024-11-12 | Stop reason: SDUPTHER

## 2024-11-12 RX ORDER — SODIUM CHLORIDE 9 MG/ML
INJECTION, SOLUTION INTRAVENOUS PRN
Status: DISCONTINUED | OUTPATIENT
Start: 2024-11-12 | End: 2024-11-12 | Stop reason: HOSPADM

## 2024-11-12 RX ORDER — MISOPROSTOL 200 UG/1
600 TABLET ORAL ONCE
Status: DISCONTINUED | OUTPATIENT
Start: 2024-11-12 | End: 2024-11-12 | Stop reason: HOSPADM

## 2024-11-12 RX ORDER — MIDAZOLAM HYDROCHLORIDE 1 MG/ML
INJECTION, SOLUTION INTRAMUSCULAR; INTRAVENOUS
Status: DISCONTINUED | OUTPATIENT
Start: 2024-11-12 | End: 2024-11-12 | Stop reason: SDUPTHER

## 2024-11-12 RX ORDER — KETOROLAC TROMETHAMINE 30 MG/ML
INJECTION, SOLUTION INTRAMUSCULAR; INTRAVENOUS
Status: DISCONTINUED | OUTPATIENT
Start: 2024-11-12 | End: 2024-11-12 | Stop reason: SDUPTHER

## 2024-11-12 RX ADMIN — WATER 2000 MG: 1 INJECTION INTRAMUSCULAR; INTRAVENOUS; SUBCUTANEOUS at 13:16

## 2024-11-12 RX ADMIN — DEXAMETHASONE SODIUM PHOSPHATE 4 MG: 4 INJECTION, SOLUTION INTRAMUSCULAR; INTRAVENOUS at 13:25

## 2024-11-12 RX ADMIN — SODIUM CHLORIDE, POTASSIUM CHLORIDE, SODIUM LACTATE AND CALCIUM CHLORIDE: 600; 310; 30; 20 INJECTION, SOLUTION INTRAVENOUS at 12:09

## 2024-11-12 RX ADMIN — MIDAZOLAM HYDROCHLORIDE 2 MG: 1 INJECTION, SOLUTION INTRAMUSCULAR; INTRAVENOUS at 13:05

## 2024-11-12 RX ADMIN — KETOROLAC TROMETHAMINE 30 MG: 30 INJECTION, SOLUTION INTRAMUSCULAR; INTRAVENOUS at 13:46

## 2024-11-12 RX ADMIN — MISOPROSTOL 200 MCG: 100 TABLET ORAL at 11:39

## 2024-11-12 RX ADMIN — ONDANSETRON HYDROCHLORIDE 4 MG: 2 INJECTION, SOLUTION INTRAMUSCULAR; INTRAVENOUS at 13:25

## 2024-11-12 RX ADMIN — SODIUM CHLORIDE, POTASSIUM CHLORIDE, SODIUM LACTATE AND CALCIUM CHLORIDE: 600; 310; 30; 20 INJECTION, SOLUTION INTRAVENOUS at 13:05

## 2024-11-12 RX ADMIN — FENTANYL CITRATE 50 MCG: 50 INJECTION, SOLUTION INTRAMUSCULAR; INTRAVENOUS at 13:09

## 2024-11-12 RX ADMIN — PROPOFOL 150 MCG/KG/MIN: 10 INJECTION, EMULSION INTRAVENOUS at 13:09

## 2024-11-12 RX ADMIN — LIDOCAINE HYDROCHLORIDE 60 MG: 20 INJECTION, SOLUTION EPIDURAL; INFILTRATION; INTRACAUDAL; PERINEURAL at 13:09

## 2024-11-12 RX ADMIN — FENTANYL CITRATE 50 MCG: 50 INJECTION, SOLUTION INTRAMUSCULAR; INTRAVENOUS at 13:14

## 2024-11-12 RX ADMIN — Medication 3 AMPULE: at 12:22

## 2024-11-12 RX ADMIN — METHYLERGONOVINE MALEATE 200 MCG: 0.2 INJECTION, SOLUTION INTRAMUSCULAR; INTRAVENOUS at 13:39

## 2024-11-12 ASSESSMENT — PAIN DESCRIPTION - DESCRIPTORS: DESCRIPTORS: CRAMPING

## 2024-11-12 ASSESSMENT — PAIN - FUNCTIONAL ASSESSMENT
PAIN_FUNCTIONAL_ASSESSMENT: ACTIVITIES ARE NOT PREVENTED
PAIN_FUNCTIONAL_ASSESSMENT: 0-10

## 2024-11-12 NOTE — ANESTHESIA PRE PROCEDURE
BMI:   Wt Readings from Last 3 Encounters:   02/06/24 70.9 kg (156 lb 3.2 oz)   10/19/22 68 kg (150 lb)     Body mass index is 23.63 kg/m².    CBC:   Lab Results   Component Value Date/Time    WBC 6.6 02/06/2024 12:00 AM    RBC 4.13 02/06/2024 12:00 AM    HGB 13.2 02/06/2024 12:00 AM    HCT 39.1 02/06/2024 12:00 AM    MCV 95 02/06/2024 12:00 AM    RDW 12.1 02/06/2024 12:00 AM     02/06/2024 12:00 AM       CMP:   Lab Results   Component Value Date/Time     02/06/2024 12:00 AM    K 4.2 02/06/2024 12:00 AM     02/06/2024 12:00 AM    CO2 23 02/06/2024 12:00 AM    BUN 12 02/06/2024 12:00 AM    CREATININE 0.79 02/06/2024 12:00 AM    AGRATIO 1.5 02/06/2024 12:00 AM    LABGLOM 101 02/06/2024 12:00 AM    GLUCOSE 88 02/06/2024 12:00 AM    CALCIUM 9.2 02/06/2024 12:00 AM    BILITOT 0.6 02/06/2024 12:00 AM    ALKPHOS 49 02/06/2024 12:00 AM    AST 14 02/06/2024 12:00 AM    ALT 12 02/06/2024 12:00 AM       POC Tests: No results for input(s): \"POCGLU\", \"POCNA\", \"POCK\", \"POCCL\", \"POCBUN\", \"POCHEMO\", \"POCHCT\" in the last 72 hours.    Coags: No results found for: \"PROTIME\", \"INR\", \"APTT\"    HCG (If Applicable): No results found for: \"PREGTESTUR\", \"PREGSERUM\", \"HCG\", \"HCGQUANT\"     ABGs: No results found for: \"PHART\", \"PO2ART\", \"JGU5ESE\", \"MKJ0TCB\", \"BEART\", \"G5DJWMSF\"     Type & Screen (If Applicable):  No results found for: \"ABORH\", \"LABANTI\"    Drug/Infectious Status (If Applicable):  No results found for: \"HIV\", \"HEPCAB\"    COVID-19 Screening (If Applicable): No results found for: \"COVID19\"        Anesthesia Evaluation  Patient summary reviewed and Nursing notes reviewed  Airway: Mallampati: II  TM distance: >3 FB   Neck ROM: full  Mouth opening: > = 3 FB   Dental: normal exam         Pulmonary:normal exam    (+)           asthma:                            Cardiovascular:Negative CV ROS                      Neuro/Psych:   Negative Neuro/Psych ROS              GI/Hepatic/Renal: Neg

## 2024-11-12 NOTE — PERIOP NOTE
1152 - PT DENIES FEVER, COLD, COUGH, SOB, N/V, DIARRHEA...   IN ROOM WITH PT.  PRE-OP TCHING DONE - BOTH VERBALIZE UNDERSTANDING.  STRETCHER IN LOWEST POSITION, CB IN PLACE AND SR  UP X2.

## 2024-11-12 NOTE — DISCHARGE INSTRUCTIONS
After Care Instructions For Your D&C      You may resume your usual diet once the nausea resolves. Initially, try sips of warm fluids and a bland diet.    Avoid heavy lifting and straining.  Gradually increase your activity. First, try walking and doing light activity around the house.  Resume your normal habits if no significant discomfort or bleeding develops.  Most women can return to work within one to four days after this procedure.     You may take showers.  Avoid using a tub bath, swimming pool or hot tub until after your check-up.      Do not place anything in your vagina until after your postoperative visit. Do not   douche, use tampons, or have intercourse because this may cause bleeding and   infection.    You may initially experience a heavy bloody discharge.  This should not be more than your menstrual flow. Over the next several days, the flow should steadily decrease.    Typically following the procedure, there is little or no pain.  You may feel cramps in your lower abdomen. Tylenol may relieve mild cramping. If pain medication does not improve your symptoms, you should contact your physician.    Contact the office if you have excessive bleeding (saturating a pad an hour for two hours or passing large clots). It is also necessary to speak with your physician if you develop chills, a temperature greater than 100.4, difficulty voiding or burning on urination.    Your physician may want to see you in the office after your D&C. Please call for an appointment if this has not already been arranged. Our office phone number is [phone]. If appropriate, the microscopic results from your procedure will be discussed at this follow-up visit.          [email or web address] [email or web address]       DISCHARGE SUMMARY from Nurse    PATIENT INSTRUCTIONS:    After general anesthesia or intravenous sedation, for 24 hours or while taking prescription narcotics:    Have someone responsible help you with your care  Limit your activities  Do

## 2024-11-12 NOTE — H&P
Gynecology History and Physical    Name: oBbbi Chen MRN: [id number] SSN: [id number]    YOB: 1990  Age: 34 y.o.  Sex: female       Subjective:      Chief complaint:  Missed     Bobbi is a 34 y.o.  female at 14 wks gestation.  Had elevated anti-Richa antibodies.  Had not been able to assess fetal antigen status but fob heterogyzous for Richa antigen.  Came in to office and found to have demise measuring 13 w5d with some hydropic changes. She is admitted for Procedure(s) (LRB):  SUCTION, DILATATION AND CURETTAGE (N/A).         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     Past Surgical History:   Procedure Laterality Date    GYN      WISDOM TOOTH EXTRACTION       Social History     Occupational History    Not on file   Tobacco Use    Smoking status: Never    Smokeless tobacco: Never   Vaping Use    Vaping status: Never Used   Substance and Sexual Activity    Alcohol use: Not Currently    Drug use: No    Sexual activity: Yes     Comment: patch     Family History   Problem Relation Age of Onset    Diabetes Maternal Aunt     Diabetes Maternal Uncle     Cancer Paternal Grandmother         lung    No Known Problems Mother     No Known Problems Father         Allergies   Allergen Reactions    Shellfish-Derived Products Diarrhea     Vomiting, chest discomfort, triggers asthma     Prior to Admission medications    Medication Sig Start Date End Date Taking? Authorizing Provider   albuterol sulfate HFA (PROVENTIL;VENTOLIN;PROAIR) 108 (90 Base) MCG/ACT inhaler Inhale 1 puff into the lungs every 4 hours as needed for Wheezing 24   Payton Farley MD        Review of Systems:  A comprehensive review of systems

## 2024-11-12 NOTE — ANESTHESIA POSTPROCEDURE EVALUATION
Department of Anesthesiology  Postprocedure Note    Patient: Bobbi Chen  MRN: [id number]  YOB: 1990  Date of evaluation: 2024    Procedure Summary       Date: 24 Room / Location: [facility] MAIN OR M1 / [facility] MAIN OR    Anesthesia Start: 1305 Anesthesia Stop: 1407    Procedure: SUCTION, DILATATION AND CURETTAGE (Cervix) Diagnosis:       Missed       (Missed  [O02.1])    Providers: Summer Shankar MD Responsible Provider: Micha Hernandez MD    Anesthesia Type: MAC ASA Status: 2            Anesthesia Type: MAC    Jenaro Phase I: Jenaro Score: 10    Jenaro Phase II: Jenaro Score: 10    Anesthesia Post Evaluation    Patient location during evaluation: PACU  Patient participation: complete - patient participated  Level of consciousness: sleepy but conscious and responsive to verbal stimuli  Airway patency: patent  Nausea & Vomiting: no vomiting and no nausea  Cardiovascular status: blood pressure returned to baseline and hemodynamically stable  Respiratory status: acceptable  Hydration status: stable  Pain management: adequate    No notable events documented.

## 2024-11-12 NOTE — OP NOTE
SUCTION CURETTAGE under ultrasound guidance FULL OP NOTE    Bobbi Chen  [id number]    DATE OF PROCEDURE:  24      PREOPERATIVE DIAGNOSIS:  Missed  [O02.1] at 14 wks    POSTOPERATIVE DIAGNOSIS:  Same as preoperative diagnosis    PROCEDURE: Procedure(s):  SUCTION, DILATATION AND CURETTAGE     SURGEON:  Summer Shankar MD  Asst: Carla Johnson MD    ANESTHESIA:  IV sedation and local    EBL: 100 ml    SPECIMENS:   ID Type Source Tests Collected by Time Destination   1 : Products of conception Tissue Products of Conception SURGICAL PATHOLOGY Summer Shankar MD 2024 1340         FINDINGS: c/w 13w5d missed ab, anterior placenta, anterior intramural fibroid    DESCRIPTION OF PROCEDURE: Time out was done to confirm the operating procedure, surgeon, patient and site.  Once confirmed by the team, procedure was started. Patient was placed on the operating table in the supine position and placed under IV sedation. 1 gm of TXA was given IV. She was repositioned in the dorsal lithotomy position and prepped and draped in the usual fashion for vaginal surgery. Cervix was exposed with a grave's vaginal speculum and grasped with a single-tooth tenaculum.  10 cc of 1% lidocaine was injected to create a cervical block.  The cervix was dilated progressively to accommodate a 14 suction curette device which was then introduced just into the endometrial cavity.  Ultrasound was performed during the procedure.  The tissue was brought down to the suction catheter with multiple passes clearing the catheter.  The fetal body and limbs were removed first.  The head then compressed and went through the suction catheter.  The placenta was then removed.  Thorough suction curettage followed by sharp curettage with a large curette followed again by suction curettage was performed until the suction returned no further membranes.  The endometrial stripe was thin with no POC visualized.  The cavity filled with blood and

## 2024-11-12 NOTE — FLOWSHEET NOTE
11/12/24 1405   Handoff   Communication Given Periop Handoff/Relief   Handoff phase Phase I receiving   Handoff Given To Danielle PACU RN   Handoff Received From Gerard OR RN/ Ericka TEJEDA   Handoff Communication Face to Face;At bedside   Time Handoff Given 1405       Patient requested to go home, denies any complaints.     11/12/24 1429   Handoff   Communication Given Transfer Handoff   Handoff phase Phase I transferring   Handoff Given To Danielle   Handoff Received From Danielle   Handoff Communication Face to Face   Time Handoff Given 7449

## 2025-03-21 ENCOUNTER — OFFICE VISIT (OUTPATIENT)
Age: 35
End: 2025-03-21
Payer: COMMERCIAL

## 2025-03-21 VITALS
DIASTOLIC BLOOD PRESSURE: 89 MMHG | BODY MASS INDEX: 24.31 KG/M2 | WEIGHT: 164.6 LBS | SYSTOLIC BLOOD PRESSURE: 126 MMHG | OXYGEN SATURATION: 98 % | RESPIRATION RATE: 20 BRPM | HEART RATE: 81 BPM | TEMPERATURE: 97.9 F

## 2025-03-21 DIAGNOSIS — L73.9 FOLLICULITIS: Primary | ICD-10-CM

## 2025-03-21 PROCEDURE — 99212 OFFICE O/P EST SF 10 MIN: CPT | Performed by: FAMILY MEDICINE

## 2025-03-21 SDOH — ECONOMIC STABILITY: FOOD INSECURITY: WITHIN THE PAST 12 MONTHS, THE FOOD YOU BOUGHT JUST DIDN'T LAST AND YOU DIDN'T HAVE MONEY TO GET MORE.: NEVER TRUE

## 2025-03-21 SDOH — ECONOMIC STABILITY: FOOD INSECURITY: WITHIN THE PAST 12 MONTHS, YOU WORRIED THAT YOUR FOOD WOULD RUN OUT BEFORE YOU GOT MONEY TO BUY MORE.: NEVER TRUE

## 2025-03-21 ASSESSMENT — PATIENT HEALTH QUESTIONNAIRE - PHQ9
SUM OF ALL RESPONSES TO PHQ QUESTIONS 1-9: 0
2. FEELING DOWN, DEPRESSED OR HOPELESS: NOT AT ALL
SUM OF ALL RESPONSES TO PHQ QUESTIONS 1-9: 0
1. LITTLE INTEREST OR PLEASURE IN DOING THINGS: NOT AT ALL

## 2025-03-21 NOTE — PROGRESS NOTES
Chief Complaint   Patient presents with    Growth on Leg       \"Have you been to the ER, urgent care clinic since your last visit?  Hospitalized since your last visit?\"    NO    “Have you seen or consulted any other health care providers outside of [facility] since your last visit?”    NO     “Have you had a pap smear?”    YES - Where: [facility] Nurse/CMA to request most recent records if not in the chart    No cervical cancer screening on file             Click Here for Release of Records Request       There were no vitals filed for this visit.   Health Maintenance Due   Topic Date Due    Varicella vaccine (1 of 2 - 13+ 2-dose series) Never done    Hepatitis B vaccine (1 of 3 - 19+ 3-dose series) Never done    Pneumococcal 0-49 years Vaccine (1 of 2 - PCV) Never done    HPV vaccine (2 - 3-dose series) 2011    Cervical cancer screen  Never done    Flu vaccine (1) 2024    COVID-19 Vaccine (3 - 2024- season) 2024    Depression Screen  2025        The patient, Bobbi Chen, identity was verified by name and .     
Transportation (Medical): No     Lack of Transportation (Non-Medical): No   Housing Stability: Low Risk  (3/21/2025)    Housing Stability Vital Sign     Unable to Pay for Housing in the Last Year: No     Number of Times Moved in the Last Year: 0     Homeless in the Last Year: No        Review of Systems    Objective:   Physical Exam  Constitutional:       Comments: /89   Pulse 81   Temp 97.9 °F (36.6 °C) (Temporal)   Resp 20   Wt 74.7 kg (164 lb 9.6 oz)   LMP 03/02/2025 (Exact Date)   SpO2 98%   Breastfeeding Unknown   BMI 24.31 kg/m²        Cardiovascular:      Rate and Rhythm: Normal rate and regular rhythm.   Pulmonary:      Effort: Pulmonary effort is normal.      Breath sounds: Normal breath sounds.           Assessment and Plan:   ASSESSMENT and PLAN  Bobbi was seen today for growth on leg.    Diagnoses and all orders for this visit:    Folliculitis/ Hair bump  Likely from shaving.  Reassures.  Will monitor.       No follow-ups on file.           I have discussed diagnosis listed in this note with pt and/or family. I have discussed treatment plans and options and the risk/benefit analysis of those options, including safe use of medications and possible medication side effects.   Through the use of shared decision making we have agreed to the above plan. The patient has received an after-visit summary and questions were answered concerning future plans and follow up.  Advise pt of any urgent changes then to proceed to the ER.            Payton Farley MD

## (undated) DEVICE — Device

## (undated) DEVICE — SOLUTION IRRIG 500ML 0.9% SOD CHLO USP POUR PLAS BTL

## (undated) DEVICE — GLOVE ORANGE PI 7   MSG9070

## (undated) DEVICE — GOWN,SIRUS,FABRNF,XL,20/CS: Brand: MEDLINE

## (undated) DEVICE — TRAP TISS DISP FOR COLL SYS BERK SAFETOUCH

## (undated) DEVICE — TUBING SUCT L6FT ID0.5IN CLR PLAS M CONN

## (undated) DEVICE — CURETTE VAC DIA12MM PLAS CRV OPN TIP RIG DISP VACURET D/E

## (undated) DEVICE — D&C MRMC: Brand: MEDLINE INDUSTRIES, INC.